# Patient Record
Sex: MALE | Race: BLACK OR AFRICAN AMERICAN | NOT HISPANIC OR LATINO | Employment: OTHER | URBAN - METROPOLITAN AREA
[De-identification: names, ages, dates, MRNs, and addresses within clinical notes are randomized per-mention and may not be internally consistent; named-entity substitution may affect disease eponyms.]

---

## 2018-12-14 ENCOUNTER — OFFICE VISIT (OUTPATIENT)
Dept: FAMILY MEDICINE | Facility: CLINIC | Age: 68
End: 2018-12-14
Payer: MEDICARE

## 2018-12-14 VITALS
BODY MASS INDEX: 27.66 KG/M2 | HEIGHT: 65 IN | TEMPERATURE: 97.1 F | DIASTOLIC BLOOD PRESSURE: 89 MMHG | OXYGEN SATURATION: 98 % | WEIGHT: 166 LBS | SYSTOLIC BLOOD PRESSURE: 147 MMHG | HEART RATE: 74 BPM

## 2018-12-14 DIAGNOSIS — Z76.89 ENCOUNTER TO ESTABLISH CARE: Primary | ICD-10-CM

## 2018-12-14 DIAGNOSIS — I20.89 STABLE ANGINA (H): ICD-10-CM

## 2018-12-14 DIAGNOSIS — I10 HTN (HYPERTENSION), BENIGN: ICD-10-CM

## 2018-12-14 DIAGNOSIS — E78.5 DYSLIPIDEMIA: ICD-10-CM

## 2018-12-14 DIAGNOSIS — Z76.0 ENCOUNTER FOR MEDICATION REFILL: ICD-10-CM

## 2018-12-14 DIAGNOSIS — I10 BENIGN ESSENTIAL HYPERTENSION: ICD-10-CM

## 2018-12-14 DIAGNOSIS — K21.9 GASTROESOPHAGEAL REFLUX DISEASE WITHOUT ESOPHAGITIS: ICD-10-CM

## 2018-12-14 DIAGNOSIS — R73.02 GLUCOSE INTOLERANCE (IMPAIRED GLUCOSE TOLERANCE): ICD-10-CM

## 2018-12-14 DIAGNOSIS — N40.0 BENIGN PROSTATIC HYPERPLASIA WITHOUT LOWER URINARY TRACT SYMPTOMS: ICD-10-CM

## 2018-12-14 PROCEDURE — 99203 OFFICE O/P NEW LOW 30 MIN: CPT | Performed by: FAMILY MEDICINE

## 2018-12-14 PROCEDURE — T1013 SIGN LANG/ORAL INTERPRETER: HCPCS | Mod: U3 | Performed by: FAMILY MEDICINE

## 2018-12-14 RX ORDER — NITROGLYCERIN 0.4 MG/1
0.4 TABLET SUBLINGUAL EVERY 5 MIN PRN
COMMUNITY
End: 2018-12-14

## 2018-12-14 RX ORDER — TAMSULOSIN HYDROCHLORIDE 0.4 MG/1
0.4 CAPSULE ORAL AT BEDTIME
Qty: 90 CAPSULE | Refills: 3 | Status: SHIPPED | OUTPATIENT
Start: 2018-12-14 | End: 2020-01-20

## 2018-12-14 RX ORDER — LISINOPRIL 10 MG/1
10 TABLET ORAL DAILY
Qty: 90 TABLET | Refills: 3 | Status: SHIPPED | OUTPATIENT
Start: 2018-12-14 | End: 2020-01-20

## 2018-12-14 RX ORDER — TAMSULOSIN HYDROCHLORIDE 0.4 MG/1
0.4 CAPSULE ORAL DAILY
Qty: 90 CAPSULE | Refills: 3 | Status: SHIPPED | OUTPATIENT
Start: 2018-12-14 | End: 2018-12-14

## 2018-12-14 RX ORDER — TAMSULOSIN HYDROCHLORIDE 0.4 MG/1
0.4 CAPSULE ORAL DAILY
COMMUNITY
End: 2018-12-14

## 2018-12-14 RX ORDER — LISINOPRIL 10 MG/1
10 TABLET ORAL DAILY
COMMUNITY
End: 2018-12-14

## 2018-12-14 RX ORDER — NITROGLYCERIN 0.4 MG/1
0.4 TABLET SUBLINGUAL EVERY 5 MIN PRN
Qty: 30 TABLET | Refills: 3 | Status: SHIPPED | OUTPATIENT
Start: 2018-12-14 | End: 2020-01-20

## 2018-12-14 RX ORDER — METOPROLOL SUCCINATE 50 MG/1
50 TABLET, EXTENDED RELEASE ORAL DAILY
Qty: 90 TABLET | Refills: 3 | Status: SHIPPED | OUTPATIENT
Start: 2018-12-14 | End: 2020-01-20

## 2018-12-14 RX ORDER — ATORVASTATIN CALCIUM 80 MG/1
80 TABLET, FILM COATED ORAL DAILY
Qty: 90 TABLET | Refills: 3 | Status: SHIPPED | OUTPATIENT
Start: 2018-12-14 | End: 2020-01-20

## 2018-12-14 RX ORDER — ATORVASTATIN CALCIUM 80 MG/1
80 TABLET, FILM COATED ORAL DAILY
COMMUNITY
End: 2018-12-14

## 2018-12-14 RX ORDER — METOPROLOL SUCCINATE 50 MG/1
50 TABLET, EXTENDED RELEASE ORAL DAILY
COMMUNITY
End: 2018-12-14

## 2018-12-14 SDOH — HEALTH STABILITY: MENTAL HEALTH: HOW OFTEN DO YOU HAVE A DRINK CONTAINING ALCOHOL?: NEVER

## 2018-12-14 ASSESSMENT — MIFFLIN-ST. JEOR: SCORE: 1452.97

## 2018-12-14 NOTE — PROGRESS NOTES
"  SUBJECTIVE:   Chris Otero is a 68 year old male who presents to clinic today for the following health issues:  Patient comes in today as a new patient, establish care and have a medication refill.  He reports 3 months ago he had a complete physical exam and blood work, he report blood work was normal.   He report in the past was told he has glucose introlance.    History of dyslipidemia, history of hypertension, history of problems, and probably benign prostate hypertrophy.    He also reports he does take nitroglycerin as needed a few times a month for chest pressure and pain.  He reports in the past he did have a complete cardiac workup which include stress test that was negative.  He reports he has an \" angina.\"    He denies chest pain, denies any pressure denies being weak denies pain with any exertion.  Denies recent sickness has no lower extremity edema.    Medication Followup of  All medications    Taking Medication as prescribed: yes    Side Effects:  None    Medication Helping Symptoms:  yes     Due to language barrier, an  was present during the history-taking and subsequent discussion (and for part of the physical exam) with this patient.    Problem list and histories reviewed & adjusted, as indicated.  Additional history: as documented    Patient Active Problem List   Diagnosis     Dyslipidemia     BPH (benign prostatic hyperplasia)     HTN (hypertension), benign     Glucose intolerance (impaired glucose tolerance)     Stable angina (H)     GERD (gastroesophageal reflux disease)     Past Surgical History:   Procedure Laterality Date     XR ANKLE SURGERY LUIS RIGHT      S/P fx.       Social History     Tobacco Use     Smoking status: Never Smoker     Smokeless tobacco: Never Used   Substance Use Topics     Alcohol use: No     Frequency: Never     History reviewed. No pertinent family history.      Current Outpatient Medications   Medication Sig Dispense Refill     atorvastatin (LIPITOR) 80 MG " "tablet Take 1 tablet (80 mg) by mouth daily 90 tablet 3     lisinopril (PRINIVIL/ZESTRIL) 10 MG tablet Take 1 tablet (10 mg) by mouth daily 90 tablet 3     metoprolol succinate ER (TOPROL-XL) 50 MG 24 hr tablet Take 1 tablet (50 mg) by mouth daily 90 tablet 3     nitroGLYcerin (NITROSTAT) 0.4 MG sublingual tablet Place 1 tablet (0.4 mg) under the tongue every 5 minutes as needed for chest pain For chest pain place 1 tablet under the tongue every 5 minutes for 3 doses. If symptoms persist 5 minutes after 1st dose call 911. 30 tablet 3     omeprazole (PRILOSEC) 20 MG DR capsule Take 1 capsule (20 mg) by mouth daily 90 capsule 3     tamsulosin (FLOMAX) 0.4 MG capsule Take 1 capsule (0.4 mg) by mouth At Bedtime 90 capsule 3     Not on File  No lab results found.   BP Readings from Last 3 Encounters:   12/14/18 147/89    Wt Readings from Last 3 Encounters:   12/14/18 75.3 kg (166 lb)                    Reviewed and updated as needed this visit by clinical staff  Tobacco  Allergies  Med Hx  Surg Hx  Fam Hx  Soc Hx      Reviewed and updated as needed this visit by Provider         ROS:  Constitutional, HEENT, cardiovascular, pulmonary, gi and gu systems are negative, except as otherwise noted.    OBJECTIVE:     /89 (BP Location: Right arm, Patient Position: Chair, Cuff Size: Adult Regular)   Pulse 74   Temp 97.1  F (36.2  C) (Oral)   Ht 1.64 m (5' 4.57\")   Wt 75.3 kg (166 lb)   SpO2 98%   BMI 28.00 kg/m    Body mass index is 28 kg/m .  GENERAL: healthy, alert and no distress  NECK: no adenopathy, no asymmetry, masses, or scars and thyroid normal to palpation  RESP: lungs clear to auscultation - no rales, rhonchi or wheezes  CV: regular rate and rhythm, normal S1 S2, no S3 or S4, no murmur, click or rub, no peripheral edema and peripheral pulses strong  ABDOMEN: soft, nontender, no hepatosplenomegaly, no masses and bowel sounds normal  MS: no gross musculoskeletal defects noted, no edema  NEURO: Normal " strength and tone, mentation intact and speech normal  PSYCH: mentation appears normal, affect normal/bright    Diagnostic Test Results:  none     ASSESSMENT/PLAN:     Hyperlipidemia; controlled   Plan:  No changes in the patient's current treatment plan    Hypertension; controlled   Associated with the following complications:    None   Plan:  No changes in the patient's current treatment plan      BP Screening:   Last 3 BP Readings:    BP Readings from Last 3 Encounters:   12/14/18 147/89       The following was recommended to the patient:  Re-screen within 4 weeks and recommend lifestyle modifications      ICD-10-CM    1. Encounter to establish care Z76.89    2. Benign essential hypertension I10 metoprolol succinate ER (TOPROL-XL) 50 MG 24 hr tablet     lisinopril (PRINIVIL/ZESTRIL) 10 MG tablet   3. Benign prostatic hyperplasia without lower urinary tract symptoms N40.0 tamsulosin (FLOMAX) 0.4 MG capsule     DISCONTINUED: tamsulosin (FLOMAX) 0.4 MG capsule   4. Dyslipidemia E78.5 atorvastatin (LIPITOR) 80 MG tablet   5. Gastroesophageal reflux disease without esophagitis K21.9 omeprazole (PRILOSEC) 20 MG DR capsule   6. HTN (hypertension), benign I10    7. Glucose intolerance (impaired glucose tolerance) R73.02    8. Stable angina (H) I20.8 nitroGLYcerin (NITROSTAT) 0.4 MG sublingual tablet   9. Encounter for medication refill Z76.0 omeprazole (PRILOSEC) 20 MG DR capsule     nitroGLYcerin (NITROSTAT) 0.4 MG sublingual tablet     metoprolol succinate ER (TOPROL-XL) 50 MG 24 hr tablet     lisinopril (PRINIVIL/ZESTRIL) 10 MG tablet     atorvastatin (LIPITOR) 80 MG tablet     tamsulosin (FLOMAX) 0.4 MG capsule     I did go over his history, his medication.  I did review his medication.    Patient reports a few months ago he had a complete physical with a normal blood work.    Was advised to follow-up in 3-6 months for complete annual exam.  MEDICATIONS:  Continue current medications without change       Ishan REGAN  MD Sukumar  [facility]

## 2019-02-13 ENCOUNTER — OFFICE VISIT (OUTPATIENT)
Dept: FAMILY MEDICINE | Facility: CLINIC | Age: 69
End: 2019-02-13
Payer: MEDICARE

## 2019-02-13 VITALS
BODY MASS INDEX: 27.66 KG/M2 | HEART RATE: 74 BPM | SYSTOLIC BLOOD PRESSURE: 143 MMHG | TEMPERATURE: 97.8 F | DIASTOLIC BLOOD PRESSURE: 85 MMHG | WEIGHT: 164 LBS | OXYGEN SATURATION: 100 %

## 2019-02-13 DIAGNOSIS — Z01.00 EXAMINATION OF EYES AND VISION: ICD-10-CM

## 2019-02-13 DIAGNOSIS — G43.109 MIGRAINE WITH AURA AND WITHOUT STATUS MIGRAINOSUS, NOT INTRACTABLE: Primary | ICD-10-CM

## 2019-02-13 DIAGNOSIS — G89.29 CHRONIC LOW BACK PAIN WITHOUT SCIATICA, UNSPECIFIED BACK PAIN LATERALITY: ICD-10-CM

## 2019-02-13 DIAGNOSIS — S39.012A STRAIN OF LUMBAR REGION, INITIAL ENCOUNTER: ICD-10-CM

## 2019-02-13 DIAGNOSIS — L85.3 DRY SKIN: ICD-10-CM

## 2019-02-13 DIAGNOSIS — M54.50 CHRONIC LOW BACK PAIN WITHOUT SCIATICA, UNSPECIFIED BACK PAIN LATERALITY: ICD-10-CM

## 2019-02-13 PROCEDURE — 99214 OFFICE O/P EST MOD 30 MIN: CPT | Performed by: FAMILY MEDICINE

## 2019-02-13 RX ORDER — SUMATRIPTAN 50 MG/1
50 TABLET, FILM COATED ORAL
Qty: 20 TABLET | Refills: 6 | Status: SHIPPED | OUTPATIENT
Start: 2019-02-13 | End: 2020-02-13

## 2019-02-13 RX ORDER — LANOLIN ALCOHOL/MO/W.PET/CERES
CREAM (GRAM) TOPICAL
Qty: 480 ML | Refills: 6 | Status: SHIPPED | OUTPATIENT
Start: 2019-02-13 | End: 2020-02-13

## 2019-02-13 NOTE — PATIENT INSTRUCTIONS
Follow up in near future for complete exam  Patient Education     Patient Education    D-Alpha Tochopheryl Acetate (Vitamin E) Oral capsule    D-Alpha Tochopheryl Acetate (Vitamin E) Oral capsule, liquid filled    D-Alpha Tochopheryl Acetate (Vitamin E) Topical oil    D-Alpha Tocopherol (Vitamin E) (Natural) Oral capsule    Dl-Alpha Tocopheryl Acetate (Vitamin E) Oral capsule    Dl-Alpha Tocopheryl Acetate (Vitamin E) Oral capsule, liquid filled    Dl-Alpha Tocopheryl Acetate (Vitamin E) Topical oil    Tocopherol (Vitamin E) Oral capsule    Tocopherol Acetate (Vitamin E) Topical cream    Vitamin E Oral capsule    Vitamin E Oral capsule, liquid filled    Vitamin E Oral drops, solution    Vitamin E Topical cream  Vitamin E Topical cream  What is this medicine?  VITAMIN E (VAJESSICA borregoh min E) is a vitamin that is being promoted for its ability to protect against aging of the skin caused by ultraviolet light, such as sunlight and to aid in the healing of minor burns and sunburns. These claims have not been evaluated by the FDA at this time. Vitamin E is a naturally occurring vitamin that is found in many foods such as cereal grains, fruits, green leafy vegetables, vegetable oils, and wheat germ oil.  This medicine may be used for other purposes; ask your health care provider or pharmacist if you have questions.  What should I tell my health care provider before I take this medicine?  They need to know if you have any of these conditions:    an unusual or allergic reaction to Vitamin E, other medicines, foods, dyes, or preservatives    pregnant or trying to get pregnant    breast-feeding  How should I use this medicine?  This medicine is for external use only. Do not take by mouth. Follow the directions on the label. Avoid contact with the eyes. If contact occurs, rinse the eyes well with plenty of cool tap water.  Talk to your pediatrician regarding the use of this medicine in children. Special care may be  needed.  Overdosage: If you think you have taken too much of this medicine contact a poison control center or emergency room at once.  NOTE: This medicine is only for you. Do not share this medicine with others.  What if I miss a dose?  This does not apply.  What may interact with this medicine?  Interactions are not expected.  This list may not describe all possible interactions. Give your health care provider a list of all the medicines, herbs, non-prescription drugs, or dietary supplements you use. Also tell them if you smoke, drink alcohol, or use illegal drugs. Some items may interact with your medicine.  What should I watch for while using this medicine?  When used to reduce fine facial lines, you may not notice any improvements right away. it may take several months to see any claimed effects. This product may not work for everyone who uses it.  If skin irritation occurs while using this product, stop using it.  There is no evidence at this time that Vitamin E skin products aid in healing of minor burns or sunburn. Use only if directed by your doctor or health care professional.  What side effects may I notice from receiving this medicine?  Side effects that you should report to your doctor or health care professional as soon as possible:    allergic reactions like skin rash, itching or hives, swelling of the face, lips, or tongue    severe burning, irritation, crusting, or swelling of the treated areas  Side effects that usually do not require medical attention (report to your doctor or health care professional if they continue or are bothersome):    mild skin irritation  This list may not describe all possible side effects. Call your doctor for medical advice about side effects. You may report side effects to FDA at 8-576-FDA-7668.  Where should I keep my medicine?  Keep out of the reach of children.  Store at room temperature between 15 and 30 degrees C (59 and 86 degrees F). Throw away any unused medicine  after the expiration date.  NOTE:This sheet is a summary. It may not cover all possible information. If you have questions about this medicine, talk to your doctor, pharmacist, or health care provider. Copyright  2016 [employer]

## 2019-02-13 NOTE — PROGRESS NOTES
SUBJECTIVE:                                                    Chris Otero is a 69 year old male who presents to clinic today for the following health issues:  Patient comes in today with a few concerns.  He reports he has chronic low back pain.  He reports he fell over 2 weeks ago onto his right side.  He reports since that time he has been having some stiffness mostly in the right lower lumbar region area.  He denies any lower extremity numbness, he has no weakness.  He is able to walk and do all his activities of daily living with no difficulty.  He also denies any pain when he sleeps no pain at night.  No loss of urine or stool.    He also has history of migraine headache he reports for the past a few months he has been having more frequent mostly once or twice per month he reports that they last for a few hours.  He reports a headache mostly throbbing in the frontal area sometimes he had changes in his vision.    Concern of a dry skin.  Especially since the beginning of the winter he reports that usually after he take a shower.  Denies any rashes.  He has no but does not scratch his skin. He is been using Vaseline lotions.     Patient also would like to have referred to have an eye exam.  Have not had one in a while.    Back Pain       Duration: A week ago        Specific cause: fall     Description:   Location of pain: low back right  Character of pain: sharp, burning, cramping and constant  Pain radiation:none  New numbness or weakness in legs, not attributed to pain:  no     Intensity: Currently 5/10, At its worst 10/10    History:   Pain interferes with job: Not applicable  History of back problems: no prior back problems  Any previous MRI or X-rays: None  Sees a specialist for back pain:  No  Therapies tried without relief: acetaminophen (Tylenol)    Alleviating factors:   Improved by: rest      Precipitating factors:  Worsened by: Lifting, Standing, Sitting and Walking    Accompanying Signs &  Symptoms:  Risk of Fracture:  None  Risk of Cauda Equina:  None  Risk of Infection:  None  Risk of Cancer:  None  Risk of Ankylosing Spondylitis:  Onset at age <35, male, AND morning back stiffness. no       Problem list and histories reviewed & adjusted, as indicated.  Additional history: as documented    Patient Active Problem List   Diagnosis     Dyslipidemia     BPH (benign prostatic hyperplasia)     HTN (hypertension), benign     Glucose intolerance (impaired glucose tolerance)     Stable angina (H)     GERD (gastroesophageal reflux disease)     Migraine headache with aura     Chronic low back pain     Past Surgical History:   Procedure Laterality Date     XR ANKLE SURGERY LUIS RIGHT      S/P fx.       Social History     Tobacco Use     Smoking status: Never Smoker     Smokeless tobacco: Never Used   Substance Use Topics     Alcohol use: No     Frequency: Never     History reviewed. No pertinent family history.      Current Outpatient Medications   Medication Sig Dispense Refill     aspirin (ASA) 81 MG tablet Take 1 tablet (81 mg) by mouth daily 90 tablet 3     atorvastatin (LIPITOR) 80 MG tablet Take 1 tablet (80 mg) by mouth daily 90 tablet 3     EUCERIN external lotion Apply tid as needed 480 mL 6     lisinopril (PRINIVIL/ZESTRIL) 10 MG tablet Take 1 tablet (10 mg) by mouth daily 90 tablet 3     metoprolol succinate ER (TOPROL-XL) 50 MG 24 hr tablet Take 1 tablet (50 mg) by mouth daily 90 tablet 3     nitroGLYcerin (NITROSTAT) 0.4 MG sublingual tablet Place 1 tablet (0.4 mg) under the tongue every 5 minutes as needed for chest pain For chest pain place 1 tablet under the tongue every 5 minutes for 3 doses. If symptoms persist 5 minutes after 1st dose call 911. 30 tablet 3     omeprazole (PRILOSEC) 20 MG DR capsule Take 1 capsule (20 mg) by mouth daily 90 capsule 3     SUMAtriptan (IMITREX) 50 MG tablet Take 1 tablet (50 mg) by mouth at onset of headache for migraine 20 tablet 6     tamsulosin (FLOMAX) 0.4 MG  capsule Take 1 capsule (0.4 mg) by mouth At Bedtime 90 capsule 3     Not on File  No lab results found.     ROS:  Constitutional, HEENT, cardiovascular, pulmonary, gi and gu systems are negative, except as otherwise noted.    OBJECTIVE:     /85 (BP Location: Right arm, Patient Position: Chair, Cuff Size: Adult Large)   Pulse 74   Temp 97.8  F (36.6  C) (Oral)   Wt 74.4 kg (164 lb)   SpO2 100%   BMI 27.66 kg/m    Body mass index is 27.66 kg/m .  GENERAL APPEARANCE: healthy, alert and no distress  ABDOMEN: soft, nontender, without hepatosplenomegaly or masses and bowel sounds normal  MS: extremities normal- no gross deformities noted  ORTHO: Lumber/Thoracic Spine Exam: Tender:  none  Non-tender:  All normal  Range of Motion:  lumbar flexion  full, lumbar extension  full  Strength:  able to heel walk, able to toe walk  Special tests:  negative straight leg raises    Hip Exam: Hip ROM full    SKIN: no suspicious lesions or rashes and xerosis - generalized  NEURO: Normal strength and tone, mentation intact, DTR symmetrically normal in lower extremities and gait normal including heel/toe/tandem walking    Diagnostic Test Results:  none     ASSESSMENT/PLAN:       ICD-10-CM    1. Migraine with aura and without status migrainosus, not intractable G43.109 SUMAtriptan (IMITREX) 50 MG tablet   2. Strain of lumbar region, initial encounter S39.012A    3. Dry skin L85.3 EUCERIN external lotion   4. Examination of eyes and vision Z01.00 OPTOMETRY REFERRAL   5. Chronic low back pain without sciatica, unspecified back pain laterality M54.5     G89.29    #1 low back pain likely muscle strain.  He was advised to remain active.  Currently, does not have any pain.  Patient reports he does not need any pain medication at this point.    2.  History of migraine headache given prescription for sumatriptan.  use as needed.  In addition, patient does take metoprolol succinate for blood pressure and that will help as a  prophylaxis.    #3 dry skin could be related to dry weather, cold weather.  He was advised to avoid hot water.  He was advised to use warm water and a quick short showers.  In addition, I advised him to use Eucerin cream or other Aveeno cream lotion his skin especially after shower.  In addition he was advised to use a different soap.    4.  Patient was referred to optometry for to have an eye exam.    I did discuss with the patient to follow-up in the near future to have a complete physical exam.    See Patient Instructions  Patient Instructions   Follow up in near future for complete exam  Patient Education     Patient Education    D-Alpha Tochopheryl Acetate (Vitamin E) Oral capsule    D-Alpha Tochopheryl Acetate (Vitamin E) Oral capsule, liquid filled    D-Alpha Tochopheryl Acetate (Vitamin E) Topical oil    D-Alpha Tocopherol (Vitamin E) (Natural) Oral capsule    Dl-Alpha Tocopheryl Acetate (Vitamin E) Oral capsule    Dl-Alpha Tocopheryl Acetate (Vitamin E) Oral capsule, liquid filled    Dl-Alpha Tocopheryl Acetate (Vitamin E) Topical oil    Tocopherol (Vitamin E) Oral capsule    Tocopherol Acetate (Vitamin E) Topical cream    Vitamin E Oral capsule    Vitamin E Oral capsule, liquid filled    Vitamin E Oral drops, solution    Vitamin E Topical cream  Vitamin E Topical cream  What is this medicine?  VITAMIN E (VAHY tuh min E) is a vitamin that is being promoted for its ability to protect against aging of the skin caused by ultraviolet light, such as sunlight and to aid in the healing of minor burns and sunburns. These claims have not been evaluated by the FDA at this time. Vitamin E is a naturally occurring vitamin that is found in many foods such as cereal grains, fruits, green leafy vegetables, vegetable oils, and wheat germ oil.  This medicine may be used for other purposes; ask your health care provider or pharmacist if you have questions.  What should I tell my health care provider before I take this  medicine?  They need to know if you have any of these conditions:    an unusual or allergic reaction to Vitamin E, other medicines, foods, dyes, or preservatives    pregnant or trying to get pregnant    breast-feeding  How should I use this medicine?  This medicine is for external use only. Do not take by mouth. Follow the directions on the label. Avoid contact with the eyes. If contact occurs, rinse the eyes well with plenty of cool tap water.  Talk to your pediatrician regarding the use of this medicine in children. Special care may be needed.  Overdosage: If you think you have taken too much of this medicine contact a poison control center or emergency room at once.  NOTE: This medicine is only for you. Do not share this medicine with others.  What if I miss a dose?  This does not apply.  What may interact with this medicine?  Interactions are not expected.  This list may not describe all possible interactions. Give your health care provider a list of all the medicines, herbs, non-prescription drugs, or dietary supplements you use. Also tell them if you smoke, drink alcohol, or use illegal drugs. Some items may interact with your medicine.  What should I watch for while using this medicine?  When used to reduce fine facial lines, you may not notice any improvements right away. it may take several months to see any claimed effects. This product may not work for everyone who uses it.  If skin irritation occurs while using this product, stop using it.  There is no evidence at this time that Vitamin E skin products aid in healing of minor burns or sunburn. Use only if directed by your doctor or health care professional.  What side effects may I notice from receiving this medicine?  Side effects that you should report to your doctor or health care professional as soon as possible:    allergic reactions like skin rash, itching or hives, swelling of the face, lips, or tongue    severe burning, irritation, crusting, or  swelling of the treated areas  Side effects that usually do not require medical attention (report to your doctor or health care professional if they continue or are bothersome):    mild skin irritation  This list may not describe all possible side effects. Call your doctor for medical advice about side effects. You may report side effects to FDA at 5-102-PLM-1201.  Where should I keep my medicine?  Keep out of the reach of children.  Store at room temperature between 15 and 30 degrees C (59 and 86 degrees F). Throw away any unused medicine after the expiration date.  NOTE:This sheet is a summary. It may not cover all possible information. If you have questions about this medicine, talk to your doctor, pharmacist, or health care provider. Copyright  2016 [employer] Standard               Ishan Patino MD  [facility]

## 2019-02-21 ENCOUNTER — APPOINTMENT (OUTPATIENT)
Dept: OPTOMETRY | Facility: CLINIC | Age: 69
End: 2019-02-21
Payer: MEDICARE

## 2019-02-21 ENCOUNTER — OFFICE VISIT (OUTPATIENT)
Dept: OPTOMETRY | Facility: CLINIC | Age: 69
End: 2019-02-21
Payer: MEDICARE

## 2019-02-21 DIAGNOSIS — Z01.01 ENCOUNTER FOR EXAMINATION OF EYES AND VISION WITH ABNORMAL FINDINGS: Primary | ICD-10-CM

## 2019-02-21 DIAGNOSIS — H52.03 HYPERMETROPIA, BILATERAL: ICD-10-CM

## 2019-02-21 DIAGNOSIS — H52.4 PRESBYOPIA: ICD-10-CM

## 2019-02-21 DIAGNOSIS — H25.813 COMBINED FORMS OF AGE-RELATED CATARACT OF BOTH EYES: ICD-10-CM

## 2019-02-21 DIAGNOSIS — H04.123 DRY EYES: ICD-10-CM

## 2019-02-21 DIAGNOSIS — H52.223 REGULAR ASTIGMATISM OF BOTH EYES: ICD-10-CM

## 2019-02-21 PROCEDURE — 92015 DETERMINE REFRACTIVE STATE: CPT | Performed by: OPTOMETRIST

## 2019-02-21 PROCEDURE — 92004 COMPRE OPH EXAM NEW PT 1/>: CPT | Performed by: OPTOMETRIST

## 2019-02-21 PROCEDURE — 92341 FIT SPECTACLES BIFOCAL: CPT | Performed by: OPTOMETRIST

## 2019-02-21 ASSESSMENT — REFRACTION_MANIFEST
OD_SPHERE: +1.25
OS_CYLINDER: +0.50
OS_SPHERE: +2.00
OD_CYLINDER: +1.50
OD_AXIS: 145
OS_AXIS: 175
OD_ADD: +2.50
OS_ADD: +2.50

## 2019-02-21 ASSESSMENT — REFRACTION_WEARINGRX
OD_ADD: +2.75
OS_CYLINDER: +0.50
OD_SPHERE: +2.25
OD_AXIS: 092
OS_AXIS: 034
SPECS_TYPE: BIFOCAL
OS_SPHERE: +2.00
OS_ADD: +2.75
OD_CYLINDER: +0.50

## 2019-02-21 ASSESSMENT — EXTERNAL EXAM - LEFT EYE: OS_EXAM: NORMAL

## 2019-02-21 ASSESSMENT — EXTERNAL EXAM - RIGHT EYE: OD_EXAM: NORMAL

## 2019-02-21 ASSESSMENT — SLIT LAMP EXAM - LIDS
COMMENTS: NORMAL
COMMENTS: NORMAL

## 2019-02-21 ASSESSMENT — VISUAL ACUITY
OD_CC: 20/60
OD_SC: 20/50
OD_CC: 20/80
CORRECTION_TYPE: GLASSES
OS_SC: 20/50
OD_SC: 20/120
METHOD: SNELLEN - LINEAR
OS_CC: 20/30 -1
OD_SC+: -1
OS_CC: 20/25
OS_SC: 20/200

## 2019-02-21 ASSESSMENT — CONF VISUAL FIELD
OS_NORMAL: 1
OD_NORMAL: 1

## 2019-02-21 ASSESSMENT — CUP TO DISC RATIO
OS_RATIO: 0.4
OD_RATIO: 0.4

## 2019-02-21 ASSESSMENT — TONOMETRY
OS_IOP_MMHG: 10
OD_IOP_MMHG: 12
IOP_METHOD: APPLANATION

## 2019-02-21 NOTE — LETTER
2/21/2019         RE: Chris Otero  [address]  [place] [address]        Dear Colleague,    Thank you for referring your patient, Chris Otero, to the [facility]. Please see a copy of my visit note below.    Chief Complaint   Patient presents with     Diabetic Eye Exam     Accompanied by   No results found for: A1C    Last Eye Exam: 1 years ago  Dilated Previously: Yes    What are you currently using to see?  Glasses- 5 years old    Distance Vision Acuity: Noticed gradual change in both eyes    Near Vision Acuity: Not satisfied     Eye Comfort: dry  Do you use eye drops? : No  Occupation or Hobbies: unemployed    Tali Silva, Optometric Tech     Medical, surgical and family histories reviewed and updated 2/21/2019.       OBJECTIVE: See Ophthalmology exam    ASSESSMENT:    ICD-10-CM    1. Encounter for examination of eyes and vision with abnormal findings Z01.01    2. Dry eyes H04.123    3. Combined forms of age-related cataract of both eyes H25.813    4. 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 Regular astigmatism of both eyes H52.223    6. Presbyopia H52.4       PLAN:    Chris Otero aware  eye exam results will be sent to Ishan Patino.  Patient Instructions    DRY EYE TREATMENT    I recommend using artificial tears for your dry eye. There are over the counter drops that work well and may be used up to 4x daily. ( systane balance, refresh optive, soothe xp)   If you need more than 4 drops daily, use a preservative free product which come in individual vials which may be used for 24 hours and discarded.     Artificial tears work best as a preventative and not as well after your eyes are starting to bother you.  It may take 4- 6 weeks of using the drops before you notice improvement.  If after that time you are still having problems schedule an appointment for an evaluation and discussion of different treatments.  Dry eyes are a chronic condition and you may have  more symptoms at certain times of the year.      Additional recommended treatment:  Warm compresses once to twice daily for 5-10 minutes    Directions for warm soaks  There are few methods for hot compresses. Moisten a washcloth with hot water, or microwave for 10 seconds, being careful to not get the cloth too hot.   Then put the washcloth onto your eyelids for 5 minutes. It will cool quickly so a rice pack or eyemask that can be heated and laid on top of the washcloth will help retain the heat.     You have the start cataract formation, more progressed in the right than the left eye.  You may notice some blurred vision or glare with night driving.  It is important that you wear good sunglasses to protect your eyes from the ultraviolet light from the sun. I recommend that you return in 1 year for an eye exam unless there are any sudden changes in your vision.       Chris was advised of today's exam findings.  Fill glasses prescription  Allow 2 weeks to adapt to change in glasses  Wear glasses full time  Copy of glasses Rx provided today.  Return in 1 year for eye exam, or sooner if needed.    The affects of the dilating drops last for 4- 6 hours.  You will be more sensitive to light and vision will be blurry up close.  Mydriatic sunglasses were given if needed.    Evaristo Watt O.D.  [address] [place]  [address]    [phone]               Again, thank you for allowing me to participate in the care of your patient.        Sincerely,        Evaristo Watt, OD

## 2019-02-21 NOTE — PATIENT INSTRUCTIONS
DRY EYE TREATMENT    I recommend using artificial tears for your dry eye. There are over the counter drops that work well and may be used up to 4x daily. ( systane balance, refresh optive, soothe xp)   If you need more than 4 drops daily, use a preservative free product which come in individual vials which may be used for 24 hours and discarded.     Artificial tears work best as a preventative and not as well after your eyes are starting to bother you.  It may take 4- 6 weeks of using the drops before you notice improvement.  If after that time you are still having problems schedule an appointment for an evaluation and discussion of different treatments.  Dry eyes are a chronic condition and you may have more symptoms at certain times of the year.      Additional recommended treatment:  Warm compresses once to twice daily for 5-10 minutes    Directions for warm soaks  There are few methods for hot compresses. Moisten a washcloth with hot water, or microwave for 10 seconds, being careful to not get the cloth too hot.   Then put the washcloth onto your eyelids for 5 minutes. It will cool quickly so a rice pack or eyemask that can be heated and laid on top of the washcloth will help retain the heat.     You have the start cataract formation, more progressed in the right than the left eye.  You may notice some blurred vision or glare with night driving.  It is important that you wear good sunglasses to protect your eyes from the ultraviolet light from the sun. I recommend that you return in 1 year for an eye exam unless there are any sudden changes in your vision.       Chris was advised of today's exam findings.  Fill glasses prescription  Allow 2 weeks to adapt to change in glasses  Wear glasses full time  Copy of glasses Rx provided today.  Return in 1 year for eye exam, or sooner if needed.    The affects of the dilating drops last for 4- 6 hours.  You will be more sensitive to light and vision will be blurry up  close.  Mydriatic sunglasses were given if needed.    Evaristo Watt O.D.  [address]  [place]  [address]    [phone]

## 2019-02-21 NOTE — PROGRESS NOTES
Chief Complaint   Patient presents with     Diabetic Eye Exam     Accompanied by   No results found for: A1C    Last Eye Exam: 1 years ago  Dilated Previously: Yes    What are you currently using to see?  Glasses- 5 years old    Distance Vision Acuity: Noticed gradual change in both eyes    Near Vision Acuity: Not satisfied     Eye Comfort: dry  Do you use eye drops? : No  Occupation or Hobbies: unemployed    Tali Silva, Optometric Tech     Medical, surgical and family histories reviewed and updated 2/21/2019.       OBJECTIVE: See Ophthalmology exam    ASSESSMENT:    ICD-10-CM    1. Encounter for examination of eyes and vision with abnormal findings Z01.01    2. Dry eyes H04.123    3. Combined forms of age-related cataract of both eyes H25.813    4. 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 Regular astigmatism of both eyes H52.223    6. Presbyopia H52.4       PLAN:    Chris Otero aware  eye exam results will be sent to Ishan Patino.  Patient Instructions    DRY EYE TREATMENT    I recommend using artificial tears for your dry eye. There are over the counter drops that work well and may be used up to 4x daily. ( systane balance, refresh optive, soothe xp)   If you need more than 4 drops daily, use a preservative free product which come in individual vials which may be used for 24 hours and discarded.     Artificial tears work best as a preventative and not as well after your eyes are starting to bother you.  It may take 4- 6 weeks of using the drops before you notice improvement.  If after that time you are still having problems schedule an appointment for an evaluation and discussion of different treatments.  Dry eyes are a chronic condition and you may have more symptoms at certain times of the year.      Additional recommended treatment:  Warm compresses once to twice daily for 5-10 minutes    Directions for warm soaks  There are few methods for hot compresses. Moisten a washcloth with hot water, or  microwave for 10 seconds, being careful to not get the cloth too hot.   Then put the washcloth onto your eyelids for 5 minutes. It will cool quickly so a rice pack or eyemask that can be heated and laid on top of the washcloth will help retain the heat.     You have the start cataract formation, more progressed in the right than the left eye.  You may notice some blurred vision or glare with night driving.  It is important that you wear good sunglasses to protect your eyes from the ultraviolet light from the sun. I recommend that you return in 1 year for an eye exam unless there are any sudden changes in your vision.       Chris was advised of today's exam findings.  Fill glasses prescription  Allow 2 weeks to adapt to change in glasses  Wear glasses full time  Copy of glasses Rx provided today.  Return in 1 year for eye exam, or sooner if needed.    The affects of the dilating drops last for 4- 6 hours.  You will be more sensitive to light and vision will be blurry up close.  Mydriatic sunglasses were given if needed.    Evaristo Watt O.D.  [address] [place]  [address]    [phone]

## 2019-05-14 ENCOUNTER — TELEPHONE (OUTPATIENT)
Dept: FAMILY MEDICINE | Facility: CLINIC | Age: 69
End: 2019-05-14

## 2019-05-14 NOTE — TELEPHONE ENCOUNTER
Red flag call taken, daughter is calling regarding her father.    He speaks some English but she is largely interpreting, she is not physically with patient so called him and conferenced him in as she did not know a lot of details.    Chris Otero is a 69 year old male who calls with chest pain.     PRESENTING PROBLEM:  Chest pain    NURSING ASSESSMENT:  Patient complains of chest pain and chest pressure/discomfort.  Onset:  More frequent since returned from [place] a week ago  Pain is characterized as pressure and tightness   Located left chest   Radiates to none.  Duration intermittent and lasting up to 5 minutes, the frequency of episodes and length of them has been increasing this past week (he reports he used to get about 2 episodes a month lasting 2 minutes).  Associated symptoms SOB.    Relieved by nitroglycerin does help.  Cardiac risk factors: prior MI.    Allergies: Not on File    MEDICATIONS:  Taking medication(s) as prescribed? N/A  Taking over the counter medication(s) ?N/A  Any medication side effects? Not Applicable    Any barriers to taking medication(s) as prescribed?  N/A  Medication(s) improving/managing symptoms?  Yes, nitor helping  Medication reconciliation completed: No    Last exam/Treatment:  2/13/19    NURSING PLAN: Nursing advice to patient to ER now, daughter will drive unless patient rapidly acutely worse then will call 911    RECOMMENDED DISPOSITION:  To ED, another person to drive - daughter  Will comply with recommendation: Yes  If further questions/concerns or if symptoms do not improve, worsen or new symptoms develop, call your PCP or [facility] as soon as possible.      Guideline used:  Chest pain  Telephone Triage Protocols for Nurses, Fifth Edition, Jayda Dorantes RN

## 2019-05-16 ENCOUNTER — OFFICE VISIT (OUTPATIENT)
Dept: FAMILY MEDICINE | Facility: CLINIC | Age: 69
End: 2019-05-16
Payer: MEDICARE

## 2019-05-16 VITALS
OXYGEN SATURATION: 98 % | TEMPERATURE: 97.4 F | DIASTOLIC BLOOD PRESSURE: 90 MMHG | WEIGHT: 158 LBS | BODY MASS INDEX: 26.65 KG/M2 | HEART RATE: 65 BPM | SYSTOLIC BLOOD PRESSURE: 147 MMHG

## 2019-05-16 DIAGNOSIS — I10 HTN (HYPERTENSION), BENIGN: ICD-10-CM

## 2019-05-16 DIAGNOSIS — I20.89 STABLE ANGINA (H): ICD-10-CM

## 2019-05-16 DIAGNOSIS — R07.9 CHEST PAIN, UNSPECIFIED TYPE: Primary | ICD-10-CM

## 2019-05-16 DIAGNOSIS — E78.5 DYSLIPIDEMIA: ICD-10-CM

## 2019-05-16 DIAGNOSIS — R73.02 GLUCOSE INTOLERANCE (IMPAIRED GLUCOSE TOLERANCE): ICD-10-CM

## 2019-05-16 LAB — HBA1C MFR BLD: 5.9 % (ref 0–5.6)

## 2019-05-16 PROCEDURE — 99214 OFFICE O/P EST MOD 30 MIN: CPT | Performed by: FAMILY MEDICINE

## 2019-05-16 PROCEDURE — 83036 HEMOGLOBIN GLYCOSYLATED A1C: CPT | Performed by: FAMILY MEDICINE

## 2019-05-16 PROCEDURE — 36415 COLL VENOUS BLD VENIPUNCTURE: CPT | Performed by: FAMILY MEDICINE

## 2019-05-16 PROCEDURE — 93000 ELECTROCARDIOGRAM COMPLETE: CPT | Performed by: FAMILY MEDICINE

## 2019-05-16 PROCEDURE — 80061 LIPID PANEL: CPT | Performed by: FAMILY MEDICINE

## 2019-05-16 PROCEDURE — 80053 COMPREHEN METABOLIC PANEL: CPT | Performed by: FAMILY MEDICINE

## 2019-05-16 ASSESSMENT — PAIN SCALES - GENERAL: PAINLEVEL: NO PAIN (0)

## 2019-05-16 NOTE — LETTER
[facility]   [address] [place]  [address]  [phone]                                   May 20, 2019    Chris Otero  [address]   [facility] [place] [address]        Dear Chris,    Normal for Lipid and CMP    Normal for A1c ( no diabetes )  Continue with current medications  Follow up in 6 months for Complete exam   thanks    Results for orders placed or performed in visit on 05/16/19   Lipid panel reflex to direct LDL Fasting   Result Value Ref Range    Cholesterol 163 <200 mg/dL    Triglycerides 71 <150 mg/dL    HDL Cholesterol 49 >39 mg/dL    LDL Cholesterol Calculated 100 (H) <100 mg/dL    Non HDL Cholesterol 114 <130 mg/dL   Comprehensive metabolic panel   Result Value Ref Range    Sodium 141 133 - 144 mmol/L    Potassium 4.0 3.4 - 5.3 mmol/L    Chloride 107 94 - 109 mmol/L    Carbon Dioxide 27 20 - 32 mmol/L    Anion Gap 7 3 - 14 mmol/L    Glucose 95 70 - 99 mg/dL    Urea Nitrogen 11 7 - 30 mg/dL    Creatinine 0.92 0.66 - 1.25 mg/dL    GFR Estimate 85 >60 mL/min/[1.73_m2]    GFR Estimate If Black >90 >60 mL/min/[1.73_m2]    Calcium 9.0 8.5 - 10.1 mg/dL    Bilirubin Total 0.5 0.2 - 1.3 mg/dL    Albumin 4.0 3.4 - 5.0 g/dL    Protein Total 7.2 6.8 - 8.8 g/dL    Alkaline Phosphatase 97 40 - 150 U/L    ALT 23 0 - 70 U/L    AST 11 0 - 45 U/L   Hemoglobin A1c   Result Value Ref Range    Hemoglobin A1C 5.9 (H) 0 - 5.6 %       If you have any questions please call the clinic at [phone]    Sincerely,    Ishan Patino MD  bmd

## 2019-05-16 NOTE — PROGRESS NOTES
SUBJECTIVE:   Chris Otero is a 69 year old male who presents to clinic today for the following   health issues:  Patient with history of stable angina.  History of hypertension, glucose intolerance.  Dyslipidemia.  Comes in today reports for the past 2 weeks he has been having episodes were he feels pressure in his chest, mostly in the left upper left mid area.  He reports sometimes he feels it under the diaphragm.  He reports never when he is walking or doing things.  He reports mostly when he is sitting.  He reports that it does not last for a minute or so.  He also reports a few episodes he did get better when he took nitroglycerin.    He denies any nausea, stomach upset. Pain did not radiate, He denies any radiation of the pain to his left arm or  back.  Denied being lightheaded or dizzy.  Otherwise he has been very stable and active.    Patient does not smoke.  He has no family history of heart disease.    Patient reports he had a stress test over 10 years ago.  He also reports he had an EKG about 5 years ago when [place] different [place].    Medication Followup of ASA and blood pressure     Taking Medication as prescribed: yes    Side Effects:  None    Medication Helping Symptoms:  yes     Additional history: as documented    Reviewed  and updated as needed this visit by clinical staff  Tobacco  Allergies  Med Hx  Surg Hx  Fam Hx  Soc Hx        Reviewed and updated as needed this visit by Provider         Patient Active Problem List   Diagnosis     Dyslipidemia     BPH (benign prostatic hyperplasia)     HTN (hypertension), benign     Glucose intolerance (impaired glucose tolerance)     Stable angina (H)     GERD (gastroesophageal reflux disease)     Migraine headache with aura     Chronic low back pain     Past Surgical History:   Procedure Laterality Date     XR ANKLE SURGERY LUIS RIGHT      S/P fx.       Social History     Tobacco Use     Smoking status: Never Smoker     Smokeless tobacco: Never  Used   Substance Use Topics     Alcohol use: No     Frequency: Never     Family History   Problem Relation Age of Onset     Glaucoma No family hx of      Macular Degeneration No family hx of          Current Outpatient Medications   Medication Sig Dispense Refill     aspirin (ASA) 81 MG tablet Take 1 tablet (81 mg) by mouth daily 90 tablet 3     atorvastatin (LIPITOR) 80 MG tablet Take 1 tablet (80 mg) by mouth daily 90 tablet 3     EUCERIN external lotion Apply tid as needed 480 mL 6     lisinopril (PRINIVIL/ZESTRIL) 10 MG tablet Take 1 tablet (10 mg) by mouth daily 90 tablet 3     metoprolol succinate ER (TOPROL-XL) 50 MG 24 hr tablet Take 1 tablet (50 mg) by mouth daily 90 tablet 3     nitroGLYcerin (NITROSTAT) 0.4 MG sublingual tablet Place 1 tablet (0.4 mg) under the tongue every 5 minutes as needed for chest pain For chest pain place 1 tablet under the tongue every 5 minutes for 3 doses. If symptoms persist 5 minutes after 1st dose call 911. 30 tablet 3     omeprazole (PRILOSEC) 20 MG DR capsule Take 1 capsule (20 mg) by mouth daily 90 capsule 3     SUMAtriptan (IMITREX) 50 MG tablet Take 1 tablet (50 mg) by mouth at onset of headache for migraine 20 tablet 6     tamsulosin (FLOMAX) 0.4 MG capsule Take 1 capsule (0.4 mg) by mouth At Bedtime 90 capsule 3     No Known Allergies    ROS:  Constitutional, HEENT, cardiovascular, pulmonary, gi and gu systems are negative, except as otherwise noted.    OBJECTIVE:     /90 (BP Location: Right arm, Patient Position: Chair, Cuff Size: Adult Regular)   Pulse 65   Temp 97.4  F (36.3  C) (Oral)   Wt 71.7 kg (158 lb)   SpO2 98%   BMI 26.65 kg/m    Body mass index is 26.65 kg/m .  GENERAL: healthy, alert and no distress  NECK: no adenopathy, no asymmetry, masses, or scars and thyroid normal to palpation  RESP: lungs clear to auscultation - no rales, rhonchi or wheezes  CV: regular rate and rhythm, normal S1 S2, no S3 or S4, no murmur, click or rub, no peripheral  edema and peripheral pulses strong  ABDOMEN: soft, nontender, no hepatosplenomegaly, no masses and bowel sounds normal  MS: no gross musculoskeletal defects noted, no edema    Diagnostic Test Results:  Orders Placed This Encounter   Procedures     Lipid panel reflex to direct LDL Fasting     Comprehensive metabolic panel     Hemoglobin A1c     CARDIOLOGY EVAL ADULT REFERRAL     EKG 12-lead complete w/read - Clinics     ASSESSMENT/PLAN:     1. Chest pain, unspecified type    - EKG 12-lead complete w/read - Clinics  - CARDIOLOGY EVAL ADULT REFERRAL  Reviewed his EKG results with him.  It shows nonspecific T wave abnormality.  Patient has no chest pain on exertion.  Denies shortness of breath.    2. HTN (hypertension), benign  Stable, continue current medications  - Comprehensive metabolic panel  - CARDIOLOGY EVAL ADULT REFERRAL    3. Dyslipidemia    - Lipid panel reflex to direct LDL Fasting    4. Glucose intolerance (impaired glucose tolerance)    - Hemoglobin A1c    5. Stable angina (H)    - CARDIOLOGY EVAL ADULT REFERRAL    Follow up in 6 months for complete exam.    There are no Patient Instructions on file for this visit.    Ishan Patino MD  [facility]

## 2019-05-17 LAB
ALBUMIN SERPL-MCNC: 4 G/DL (ref 3.4–5)
ALP SERPL-CCNC: 97 U/L (ref 40–150)
ALT SERPL W P-5'-P-CCNC: 23 U/L (ref 0–70)
ANION GAP SERPL CALCULATED.3IONS-SCNC: 7 MMOL/L (ref 3–14)
AST SERPL W P-5'-P-CCNC: 11 U/L (ref 0–45)
BILIRUB SERPL-MCNC: 0.5 MG/DL (ref 0.2–1.3)
BUN SERPL-MCNC: 11 MG/DL (ref 7–30)
CALCIUM SERPL-MCNC: 9 MG/DL (ref 8.5–10.1)
CHLORIDE SERPL-SCNC: 107 MMOL/L (ref 94–109)
CHOLEST SERPL-MCNC: 163 MG/DL
CO2 SERPL-SCNC: 27 MMOL/L (ref 20–32)
CREAT SERPL-MCNC: 0.92 MG/DL (ref 0.66–1.25)
GFR SERPL CREATININE-BSD FRML MDRD: 85 ML/MIN/{1.73_M2}
GLUCOSE SERPL-MCNC: 95 MG/DL (ref 70–99)
HDLC SERPL-MCNC: 49 MG/DL
LDLC SERPL CALC-MCNC: 100 MG/DL
NONHDLC SERPL-MCNC: 114 MG/DL
POTASSIUM SERPL-SCNC: 4 MMOL/L (ref 3.4–5.3)
PROT SERPL-MCNC: 7.2 G/DL (ref 6.8–8.8)
SODIUM SERPL-SCNC: 141 MMOL/L (ref 133–144)
TRIGL SERPL-MCNC: 71 MG/DL

## 2019-06-14 ENCOUNTER — OFFICE VISIT (OUTPATIENT)
Dept: CARDIOLOGY | Facility: CLINIC | Age: 69
End: 2019-06-14
Attending: FAMILY MEDICINE
Payer: MEDICARE

## 2019-06-14 VITALS
HEART RATE: 62 BPM | HEIGHT: 65 IN | SYSTOLIC BLOOD PRESSURE: 155 MMHG | BODY MASS INDEX: 27.49 KG/M2 | DIASTOLIC BLOOD PRESSURE: 93 MMHG | WEIGHT: 165 LBS

## 2019-06-14 DIAGNOSIS — I20.89 STABLE ANGINA (H): ICD-10-CM

## 2019-06-14 DIAGNOSIS — I10 HTN (HYPERTENSION), BENIGN: ICD-10-CM

## 2019-06-14 DIAGNOSIS — R07.2 PRECORDIAL PAIN: Primary | ICD-10-CM

## 2019-06-14 PROCEDURE — 99204 OFFICE O/P NEW MOD 45 MIN: CPT | Performed by: INTERNAL MEDICINE

## 2019-06-14 ASSESSMENT — MIFFLIN-ST. JEOR: SCORE: 1442.38

## 2019-06-14 NOTE — PROGRESS NOTES
Service Date: 06/14/2019      PRIMARY CARE PHYSICIAN:  Dr. Darron Patino.      HISTORY OF PRESENT ILLNESS:  I had the pleasure of seeing your patient, Chris Otero, at [facility] for evaluation of intermittent left-sided chest discomfort.  This patient notes that he had a stress test 3 years ago for chest discomfort.  Since April the chest discomfort has become stronger.  It comes and goes but it occurs more frequently since April.  He feels as though, periodically, he has needles pinching his chest.  He says sublingual nitroglycerin helps.  He will have some chest heaviness at rest with radiation to the left side of his chest but not to his arms, jaw or back.  He denies nausea, vomiting or diaphoresis.  He denies shortness of breath, PND or orthopnea.  No palpitations, syncope or presyncope.  He has a history of hypertension but no diabetes.  He exercises by walking and sometimes has his symptoms and other times can walk free of symptoms.  He is a retired .  His only previous surgery is ORIF of his left leg.  He has no family history of heart disease.  Mother lived until 110, father 116.  He is a nonsmoker.  He denies PND, orthopnea or peripheral edema.      The patient denies previous myocardial infarction or stroke.      PHYSICAL EXAMINATION:  As listed below.      EKG personally reviewed with this patient is normal.      I reviewed his laboratory tests from 05/16/2019 all of which were normal.  Hemoglobin A1c is borderline high at 5.9%.  LDL was 100, total cholesterol 163, HDL 49, triglycerides 71.  Glucose 95.      ASSESSMENT:   1.  Chris Otero is a delightful 69-year-old [place] male who presents for atypical chest pain.  His chest symptoms occur at rest or with exertion.  They are alleviated with sublingual nitroglycerin and rest.  They can last anywhere from seconds to minutes.  He has not had any chest trauma.  Risk factors include hypertension.  He has no family history of  heart disease.  I would perform a stress echocardiogram at this time.  If this is normal I suspect this is musculoskeletal chest pain.   2.  Hypertension.  At least today his blood pressure is poorly controlled.  I reviewed a blood pressure from 2019 where his blood pressure was 147/90.  His blood pressures remained high since 2018.  He is on low-dose medications and I would suggest that he returns to his family physician for further evaluation and treatment.      PLAN:   1.  Stress echocardiogram.  We will hold his Toprol-XL the day before and the day of the test.  He will take all of his other medications as ordered.   2.  Continued exercise as before.   3.  The patient will return to his family physician for further aggressive treatment of his hypertension.  Perhaps increasing his lisinopril or even adding hydrochlorothiazide would be efficacious.      It is my pleasure to assist in the care of Chris Otero.  A [employer]  was present throughout this visit.  All the patient's questions were answered to his satisfaction.      Wicho Hong MD        cc:   [place] [address]         WICHO HONG MD, [facility]             D: 2019   T: 2019   MT: DAT      Name:     CHRIS OTERO   MRN:      [id number]        Account:      [id number]   :      1950           Service Date: 2019      Document: [id number]

## 2019-06-14 NOTE — PROGRESS NOTES
HPI and Plan:   See dictation:[id number]    Orders Placed This Encounter   Procedures     Exercise Stress Echocardiogram       No orders of the defined types were placed in this encounter.      There are no discontinued medications.      Encounter Diagnoses   Name Primary?     Precordial pain Yes     Stable angina (H)      HTN (hypertension), benign        CURRENT MEDICATIONS:  Current Outpatient Medications   Medication Sig Dispense Refill     aspirin (ASA) 81 MG tablet Take 1 tablet (81 mg) by mouth daily 90 tablet 3     atorvastatin (LIPITOR) 80 MG tablet Take 1 tablet (80 mg) by mouth daily 90 tablet 3     lisinopril (PRINIVIL/ZESTRIL) 10 MG tablet Take 1 tablet (10 mg) by mouth daily 90 tablet 3     metoprolol succinate ER (TOPROL-XL) 50 MG 24 hr tablet Take 1 tablet (50 mg) by mouth daily 90 tablet 3     nitroGLYcerin (NITROSTAT) 0.4 MG sublingual tablet Place 1 tablet (0.4 mg) under the tongue every 5 minutes as needed for chest pain For chest pain place 1 tablet under the tongue every 5 minutes for 3 doses. If symptoms persist 5 minutes after 1st dose call 911. 30 tablet 3     omeprazole (PRILOSEC) 20 MG DR capsule Take 1 capsule (20 mg) by mouth daily 90 capsule 3     SUMAtriptan (IMITREX) 50 MG tablet Take 1 tablet (50 mg) by mouth at onset of headache for migraine 20 tablet 6     tamsulosin (FLOMAX) 0.4 MG capsule Take 1 capsule (0.4 mg) by mouth At Bedtime 90 capsule 3     EUCERIN external lotion Apply tid as needed 480 mL 6       ALLERGIES   No Known Allergies    PAST MEDICAL HISTORY:  Past Medical History:   Diagnosis Date     Abnormal EKG      BPH (benign prostatic hyperplasia)      Chest discomfort      Chronic low back pain      Diabetes (H)      Dyslipidemia      GERD (gastroesophageal reflux disease)      Glucose intolerance (impaired glucose tolerance)      HTN (hypertension), benign      Migraine headache with aura      Stable angina (H)        PAST SURGICAL HISTORY:  Past Surgical History:    Procedure Laterality Date     XR ANKLE SURGERY LUIS RIGHT      S/P fx.       FAMILY HISTORY:  Family History   Problem Relation Age of Onset     No Known Problems Mother      No Known Problems Father      No Known Problems Maternal Grandmother      No Known Problems Maternal Grandfather      No Known Problems Paternal Grandmother      No Known Problems Paternal Grandfather      Glaucoma No family hx of      Macular Degeneration No family hx of        SOCIAL HISTORY:  Social History     Socioeconomic History     Marital status:      Spouse name: None     Number of children: None     Years of education: None     Highest education level: None   Occupational History     None   Social Needs     Financial resource strain: None     Food insecurity:     Worry: None     Inability: None     Transportation needs:     Medical: None     Non-medical: None   Tobacco Use     Smoking status: Never Smoker     Smokeless tobacco: Never Used   Substance and Sexual Activity     Alcohol use: No     Frequency: Never     Drug use: No     Sexual activity: Yes     Partners: Female   Lifestyle     Physical activity:     Days per week: None     Minutes per session: None     Stress: None   Relationships     Social connections:     Talks on phone: None     Gets together: None     Attends [employer] service: None     Active member of club or organization: None     Attends meetings of clubs or organizations: None     Relationship status: None     Intimate partner violence:     Fear of current or ex partner: None     Emotionally abused: None     Physically abused: None     Forced sexual activity: None   Other Topics Concern     Parent/sibling w/ CABG, MI or angioplasty before 65F 55M? Not Asked   Social History Narrative     None       Review of Systems:  Skin:  Positive for itching sometimes   Eyes:  Positive for glasses    ENT:  Negative      Respiratory:  Negative       Cardiovascular:    chest pain;Positive  "for;palpitations;lightheadedness occasional palpitations, chest pain comes and goes  Gastroenterology: Positive for heartburn;nausea    Genitourinary:  not assessed      Musculoskeletal:  not assessed      Neurologic:  Positive for migraine headaches    Psychiatric:  not assessed      Heme/Lymph/Imm:  Negative      Endocrine:  Negative        Physical Exam:  Vitals: BP (!) 155/93   Pulse 62   Ht 1.638 m (5' 4.5\")   Wt 74.8 kg (165 lb)   BMI 27.88 kg/m      Constitutional:  cooperative, alert and oriented, well developed, well nourished, in no acute distress        Skin:  warm and dry to the touch, no apparent skin lesions or masses noted          Head:  normocephalic, no masses or lesions        Eyes:  pupils equal and round, conjunctivae and lids unremarkable, sclera white, no xanthalasma, EOMS intact, no nystagmus        Lymph:No Cervical lymphadenopathy present     ENT:  no pallor or cyanosis, dentition good        Neck:  carotid pulses are full and equal bilaterally, JVP normal, no carotid bruit        Respiratory:  normal breath sounds, clear to auscultation, normal A-P diameter, normal symmetry, normal respiratory excursion, no use of accessory muscles         Cardiac: regular rhythm, normal S1/S2, no S3 or S4, apical impulse not displaced, no murmurs, gallops or rubs                pulses full and equal, no bruits auscultated                                        GI:  abdomen soft, non-tender, BS normoactive, no mass, no HSM, no bruits        Extremities and Muscular Skeletal:  no deformities, clubbing, cyanosis, erythema observed;no edema              Neurological:  no gross motor deficits;affect appropriate        Psych:  Alert and Oriented x 3        CC  Ishan Patino MD  [address] [place] [address]              "

## 2019-06-14 NOTE — LETTER
6/14/2019    Ishan Patino MD  [address] [place], [place]. \[facility].\"" [id number]    RE: Bakzuleika Wuikh       Dear Colleague,    I had the pleasure of seeing Chris  in the [facility].    HPI and Plan:   See dictation:[id number]    Orders Placed This Encounter   Procedures     Exercise Stress Echocardiogram       No orders of the defined types were placed in this encounter.      There are no discontinued medications.      Encounter Diagnoses   Name Primary?     Precordial pain Yes     Stable angina (H)      HTN (hypertension), benign        CURRENT MEDICATIONS:  Current Outpatient Medications   Medication Sig Dispense Refill     aspirin (ASA) 81 MG tablet Take 1 tablet (81 mg) by mouth daily 90 tablet 3     atorvastatin (LIPITOR) 80 MG tablet Take 1 tablet (80 mg) by mouth daily 90 tablet 3     lisinopril (PRINIVIL/ZESTRIL) 10 MG tablet Take 1 tablet (10 mg) by mouth daily 90 tablet 3     metoprolol succinate ER (TOPROL-XL) 50 MG 24 hr tablet Take 1 tablet (50 mg) by mouth daily 90 tablet 3     nitroGLYcerin (NITROSTAT) 0.4 MG sublingual tablet Place 1 tablet (0.4 mg) under the tongue every 5 minutes as needed for chest pain For chest pain place 1 tablet under the tongue every 5 minutes for 3 doses. If symptoms persist 5 minutes after 1st dose call 911. 30 tablet 3     omeprazole (PRILOSEC) 20 MG DR capsule Take 1 capsule (20 mg) by mouth daily 90 capsule 3     SUMAtriptan (IMITREX) 50 MG tablet Take 1 tablet (50 mg) by mouth at onset of headache for migraine 20 tablet 6     tamsulosin (FLOMAX) 0.4 MG capsule Take 1 capsule (0.4 mg) by mouth At Bedtime 90 capsule 3     EUCERIN external lotion Apply tid as needed 480 mL 6       ALLERGIES   No Known Allergies    PAST MEDICAL HISTORY:  Past Medical History:   Diagnosis Date     Abnormal EKG      BPH (benign prostatic hyperplasia)      Chest discomfort      Chronic low back pain      Diabetes (H)      Dyslipidemia      GERD  (gastroesophageal reflux disease)      Glucose intolerance (impaired glucose tolerance)      HTN (hypertension), benign      Migraine headache with aura      Stable angina (H)        PAST SURGICAL HISTORY:  Past Surgical History:   Procedure Laterality Date     XR ANKLE SURGERY LUIS RIGHT      S/P fx.       FAMILY HISTORY:  Family History   Problem Relation Age of Onset     No Known Problems Mother      No Known Problems Father      No Known Problems Maternal Grandmother      No Known Problems Maternal Grandfather      No Known Problems Paternal Grandmother      No Known Problems Paternal Grandfather      Glaucoma No family hx of      Macular Degeneration No family hx of        SOCIAL HISTORY:  Social History     Socioeconomic History     Marital status:      Spouse name: None     Number of children: None     Years of education: None     Highest education level: None   Occupational History     None   Social Needs     Financial resource strain: None     Food insecurity:     Worry: None     Inability: None     Transportation needs:     Medical: None     Non-medical: None   Tobacco Use     Smoking status: Never Smoker     Smokeless tobacco: Never Used   Substance and Sexual Activity     Alcohol use: No     Frequency: Never     Drug use: No     Sexual activity: Yes     Partners: Female   Lifestyle     Physical activity:     Days per week: None     Minutes per session: None     Stress: None   Relationships     Social connections:     Talks on phone: None     Gets together: None     Attends [employer] service: None     Active member of club or organization: None     Attends meetings of clubs or organizations: None     Relationship status: None     Intimate partner violence:     Fear of current or ex partner: None     Emotionally abused: None     Physically abused: None     Forced sexual activity: None   Other Topics Concern     Parent/sibling w/ CABG, MI or angioplasty before 65F 55M? Not Asked   Social History  "Narrative     None       Review of Systems:  Skin:  Positive for itching sometimes   Eyes:  Positive for glasses    ENT:  Negative      Respiratory:  Negative       Cardiovascular:    chest pain;Positive for;palpitations;lightheadedness occasional palpitations, chest pain comes and goes  Gastroenterology: Positive for heartburn;nausea    Genitourinary:  not assessed      Musculoskeletal:  not assessed      Neurologic:  Positive for migraine headaches    Psychiatric:  not assessed      Heme/Lymph/Imm:  Negative      Endocrine:  Negative        Physical Exam:  Vitals: BP (!) 155/93   Pulse 62   Ht 1.638 m (5' 4.5\")   Wt 74.8 kg (165 lb)   BMI 27.88 kg/m       Constitutional:  cooperative, alert and oriented, well developed, well nourished, in no acute distress        Skin:  warm and dry to the touch, no apparent skin lesions or masses noted          Head:  normocephalic, no masses or lesions        Eyes:  pupils equal and round, conjunctivae and lids unremarkable, sclera white, no xanthalasma, EOMS intact, no nystagmus        Lymph:No Cervical lymphadenopathy present     ENT:  no pallor or cyanosis, dentition good        Neck:  carotid pulses are full and equal bilaterally, JVP normal, no carotid bruit        Respiratory:  normal breath sounds, clear to auscultation, normal A-P diameter, normal symmetry, normal respiratory excursion, no use of accessory muscles         Cardiac: regular rhythm, normal S1/S2, no S3 or S4, apical impulse not displaced, no murmurs, gallops or rubs                pulses full and equal, no bruits auscultated                                        GI:  abdomen soft, non-tender, BS normoactive, no mass, no HSM, no bruits        Extremities and Muscular Skeletal:  no deformities, clubbing, cyanosis, erythema observed;no edema              Neurological:  no gross motor deficits;affect appropriate        Psych:  Alert and Oriented x 3        CC  Ishan Patino MD  [address] [place] " [place] [address]                Thank you for allowing me to participate in the care of your patient.      Sincerely,     Dallin Akers MD     [facility]    cc:   Ishan Patino MD  [address] [place] [id number]

## 2019-06-14 NOTE — LETTER
6/14/2019      Ishan Patino MD  [address] [facility] [address]      RE: Chris Otero       Dear Colleague,    I had the pleasure of seeing Chris Otero in the [facility].    Service Date: 06/14/2019      PRIMARY CARE PHYSICIAN:  Dr. Darron Patino.      HISTORY OF PRESENT ILLNESS:  I had the pleasure of seeing your patient, Chris Otero, at [facility] for evaluation of intermittent left-sided chest discomfort.  This patient notes that he had a stress test 3 years ago for chest discomfort.  Since April the chest discomfort has become stronger.  It comes and goes but it occurs more frequently since April.  He feels as though, periodically, he has needles pinching his chest.  He says sublingual nitroglycerin helps.  He will have some chest heaviness at rest with radiation to the left side of his chest but not to his arms, jaw or back.  He denies nausea, vomiting or diaphoresis.  He denies shortness of breath, PND or orthopnea.  No palpitations, syncope or presyncope.  He has a history of hypertension but no diabetes.  He exercises by walking and sometimes has his symptoms and other times can walk free of symptoms.  He is a retired .  His only previous surgery is ORIF of his left leg.  He has no family history of heart disease.  Mother lived until 110, father 116.  He is a nonsmoker.  He denies PND, orthopnea or peripheral edema.      The patient denies previous myocardial infarction or stroke.      PHYSICAL EXAMINATION:  As listed below.      EKG personally reviewed with this patient is normal.      I reviewed his laboratory tests from 05/16/2019 all of which were normal.  Hemoglobin A1c is borderline high at 5.9%.  LDL was 100, total cholesterol 163, HDL 49, triglycerides 71.  Glucose 95.      ASSESSMENT:   1.  Chris Otero is a delightful 69-year-old [place] male who presents for atypical chest pain.  His chest symptoms occur at rest  or with exertion.  They are alleviated with sublingual nitroglycerin and rest.  They can last anywhere from seconds to minutes.  He has not had any chest trauma.  Risk factors include hypertension.  He has no family history of heart disease.  I would perform a stress echocardiogram at this time.  If this is normal I suspect this is musculoskeletal chest pain.   2.  Hypertension.  At least today his blood pressure is poorly controlled.  I reviewed a blood pressure from 2019 where his blood pressure was 147/90.  His blood pressures remained high since 2018.  He is on low-dose medications and I would suggest that he returns to his family physician for further evaluation and treatment.      PLAN:   1.  Stress echocardiogram.  We will hold his Toprol-XL the day before and the day of the test.  He will take all of his other medications as ordered.   2.  Continued exercise as before.   3.  The patient will return to his family physician for further aggressive treatment of his hypertension.  Perhaps increasing his lisinopril or even adding hydrochlorothiazide would be efficacious.      It is my pleasure to assist in the care of Chris Otero.  A [facility]  was present throughout this visit.  All the patient's questions were answered to his satisfaction.      Wicho Hong MD        cc:   [place] [address]         WICHO HONG MD, [facility]             D: 2019   T: 2019   MT: DAT      Name:     CHRIS OTERO   MRN:      [id number]        Account:      [id number]   :      1950           Service Date: 2019      Document: [id number]           Outpatient Encounter Medications as of 2019   Medication Sig Dispense Refill     aspirin (ASA) 81 MG tablet Take 1 tablet (81 mg) by mouth daily 90 tablet 3     atorvastatin (LIPITOR) 80 MG tablet Take 1 tablet (80 mg) by mouth daily 90 tablet 3      lisinopril (PRINIVIL/ZESTRIL) 10 MG tablet Take 1 tablet (10 mg) by mouth daily 90 tablet 3     metoprolol succinate ER (TOPROL-XL) 50 MG 24 hr tablet Take 1 tablet (50 mg) by mouth daily 90 tablet 3     nitroGLYcerin (NITROSTAT) 0.4 MG sublingual tablet Place 1 tablet (0.4 mg) under the tongue every 5 minutes as needed for chest pain For chest pain place 1 tablet under the tongue every 5 minutes for 3 doses. If symptoms persist 5 minutes after 1st dose call 911. 30 tablet 3     omeprazole (PRILOSEC) 20 MG DR capsule Take 1 capsule (20 mg) by mouth daily 90 capsule 3     SUMAtriptan (IMITREX) 50 MG tablet Take 1 tablet (50 mg) by mouth at onset of headache for migraine 20 tablet 6     tamsulosin (FLOMAX) 0.4 MG capsule Take 1 capsule (0.4 mg) by mouth At Bedtime 90 capsule 3     EUCERIN external lotion Apply tid as needed 480 mL 6     No facility-administered encounter medications on file as of 6/14/2019.        Again, thank you for allowing me to participate in the care of your patient.      Sincerely,    Dallin Akers MD     [facility]

## 2019-06-18 ENCOUNTER — HOSPITAL ENCOUNTER (OUTPATIENT)
Dept: CARDIOLOGY | Facility: CLINIC | Age: 69
Discharge: HOME | End: 2019-06-18
Attending: INTERNAL MEDICINE | Admitting: INTERNAL MEDICINE
Payer: MEDICARE

## 2019-06-18 DIAGNOSIS — R07.2 PRECORDIAL PAIN: ICD-10-CM

## 2019-06-18 PROCEDURE — 93350 STRESS TTE ONLY: CPT | Mod: 26 | Performed by: INTERNAL MEDICINE

## 2019-06-18 PROCEDURE — 93325 DOPPLER ECHO COLOR FLOW MAPG: CPT | Mod: TC

## 2019-06-18 PROCEDURE — 93016 CV STRESS TEST SUPVJ ONLY: CPT | Performed by: INTERNAL MEDICINE

## 2019-06-18 PROCEDURE — 93325 DOPPLER ECHO COLOR FLOW MAPG: CPT | Mod: 26 | Performed by: INTERNAL MEDICINE

## 2019-06-18 PROCEDURE — 93321 DOPPLER ECHO F-UP/LMTD STD: CPT | Mod: 26 | Performed by: INTERNAL MEDICINE

## 2019-06-18 PROCEDURE — 93018 CV STRESS TEST I&R ONLY: CPT | Performed by: INTERNAL MEDICINE

## 2019-06-24 ENCOUNTER — TELEPHONE (OUTPATIENT)
Dept: CARDIOLOGY | Facility: CLINIC | Age: 69
End: 2019-06-24

## 2019-06-24 NOTE — TELEPHONE ENCOUNTER
Spoke with patient through a [facility]  about stress test results and Dr. Akers's comments. Patient had no further questions.

## 2019-06-24 NOTE — TELEPHONE ENCOUNTER
RN called patient and left VM advising patient to call back to discuss Stress ECHO results and recommendations.    The above message was left utilizing a [employer] .       ----- Message from Dallin Akers MD sent at 6/23/2019 10:33 PM CDT -----  Normal stress echo.  Would suggest aggressive antihypertension treatment through his PMD.  F/U with me prn.  Dallin Akers MD, FACC  June 23, 2019 10:33 PM

## 2020-01-17 DIAGNOSIS — I10 BENIGN ESSENTIAL HYPERTENSION: ICD-10-CM

## 2020-01-17 DIAGNOSIS — G43.109 MIGRAINE WITH AURA AND WITHOUT STATUS MIGRAINOSUS, NOT INTRACTABLE: ICD-10-CM

## 2020-01-17 DIAGNOSIS — K21.9 GASTROESOPHAGEAL REFLUX DISEASE WITHOUT ESOPHAGITIS: ICD-10-CM

## 2020-01-17 DIAGNOSIS — N40.0 BENIGN PROSTATIC HYPERPLASIA WITHOUT LOWER URINARY TRACT SYMPTOMS: ICD-10-CM

## 2020-01-17 DIAGNOSIS — E78.5 DYSLIPIDEMIA: ICD-10-CM

## 2020-01-17 DIAGNOSIS — I20.89 STABLE ANGINA (H): ICD-10-CM

## 2020-01-17 DIAGNOSIS — Z76.0 ENCOUNTER FOR MEDICATION REFILL: ICD-10-CM

## 2020-01-17 NOTE — TELEPHONE ENCOUNTER
"Requested Prescriptions   Pending Prescriptions Disp Refills     tamsulosin (FLOMAX) 0.4 MG capsule 90 capsule 3     Sig: Take 1 capsule (0.4 mg) by mouth At Bedtime   Last Written Prescription Date:  12/14/18  Last Fill Quantity: 90,  # refills: 3   Last office visit: 5/16/2019 with prescribing provider:     Future Office Visit:        Alpha Blockers Failed - 1/17/2020  9:35 AM        Failed - Blood pressure under 140/90 in past 12 months     BP Readings from Last 3 Encounters:   06/14/19 (!) 155/93   05/16/19 147/90   02/13/19 143/85                 Passed - Recent (12 mo) or future (30 days) visit within the authorizing provider's specialty     Patient has had an office visit with the authorizing provider or a provider within the authorizing providers department within the previous 12 mos or has a future within next 30 days. See \"Patient Info\" tab in inbasket, or \"Choose Columns\" in Meds & Orders section of the refill encounter.              Passed - Patient does not have Tadalafil, Vardenafil, or Sildenafil on their medication list        Passed - Medication is active on med list        Passed - Patient is 18 years of age or older        atorvastatin (LIPITOR) 80 MG tablet 90 tablet 3     Sig: Take 1 tablet (80 mg) by mouth daily   Last Written Prescription Date:  12/14/18  Last Fill Quantity: 90,  # refills: 3   Last office visit: 5/16/2019 with prescribing provider:     Future Office Visit:        Statins Protocol Passed - 1/17/2020  9:35 AM        Passed - LDL on file in past 12 months     Recent Labs   Lab Test 05/16/19  1721   *             Passed - No abnormal creatine kinase in past 12 months     No lab results found.             Passed - Recent (12 mo) or future (30 days) visit within the authorizing provider's specialty     Patient has had an office visit with the authorizing provider or a provider within the authorizing providers department within the previous 12 mos or has a future within next " "30 days. See \"Patient Info\" tab in inbasket, or \"Choose Columns\" in Meds & Orders section of the refill encounter.              Passed - Medication is active on med list        Passed - Patient is age 18 or older        lisinopril (PRINIVIL/ZESTRIL) 10 MG tablet 90 tablet 3     Sig: Take 1 tablet (10 mg) by mouth daily   Last Written Prescription Date:  12/14/18  Last Fill Quantity: 90,  # refills: 3   Last office visit: 5/16/2019 with prescribing provider:     Future Office Visit:        ACE Inhibitors (Including Combos) Protocol Failed - 1/17/2020  9:35 AM        Failed - Blood pressure under 140/90 in past 12 months     BP Readings from Last 3 Encounters:   06/14/19 (!) 155/93   05/16/19 147/90   02/13/19 143/85                 Passed - Recent (12 mo) or future (30 days) visit within the authorizing provider's specialty     Patient has had an office visit with the authorizing provider or a provider within the authorizing providers department within the previous 12 mos or has a future within next 30 days. See \"Patient Info\" tab in inbasket, or \"Choose Columns\" in Meds & Orders section of the refill encounter.              Passed - Medication is active on med list        Passed - Patient is age 18 or older        Passed - Normal serum creatinine on file in past 12 months     Recent Labs   Lab Test 05/16/19  1721   CR 0.92             Passed - Normal serum potassium on file in past 12 months     Recent Labs   Lab Test 05/16/19  1721   POTASSIUM 4.0             metoprolol succinate ER (TOPROL-XL) 50 MG 24 hr tablet 90 tablet 3     Sig: Take 1 tablet (50 mg) by mouth daily   Last Written Prescription Date:  12/14/18  Last Fill Quantity: 90,  # refills: 3   Last office visit: 5/16/2019 with prescribing provider:     Future Office Visit:        Beta-Blockers Protocol Failed - 1/17/2020  9:35 AM        Failed - Blood pressure under 140/90 in past 12 months     BP Readings from Last 3 Encounters:   06/14/19 (!) 155/93 " "  05/16/19 147/90   02/13/19 143/85                 Passed - Patient is age 6 or older        Passed - Recent (12 mo) or future (30 days) visit within the authorizing provider's specialty     Patient has had an office visit with the authorizing provider or a provider within the authorizing providers department within the previous 12 mos or has a future within next 30 days. See \"Patient Info\" tab in inbasket, or \"Choose Columns\" in Meds & Orders section of the refill encounter.              Passed - Medication is active on med list        nitroGLYcerin (NITROSTAT) 0.4 MG sublingual tablet 30 tablet 3     Sig: Place 1 tablet (0.4 mg) under the tongue every 5 minutes as needed for chest pain For chest pain place 1 tablet under the tongue every 5 minutes for 3 doses. If symptoms persist 5 minutes after 1st dose call 911.   Last Written Prescription Date:  12/14/18  Last Fill Quantity: 30,  # refills: 3   Last office visit: 5/16/2019 with prescribing provider:     Future Office Visit:        Nitrates Failed - 1/17/2020  9:35 AM        Failed - Blood pressure under 140/90 in past 12 months     BP Readings from Last 3 Encounters:   06/14/19 (!) 155/93   05/16/19 147/90   02/13/19 143/85                 Failed - Sublingual nitro order needs review     If refill exceeds 1 bottle per month, please forward request to provider.           Passed - Pt is not on erectile dysfunction medications        Passed - Recent (12 mo) or future (30 days) visit within the authorizing provider's specialty     Patient has had an office visit with the authorizing provider or a provider within the authorizing providers department within the previous 12 mos or has a future within next 30 days. See \"Patient Info\" tab in inbasket, or \"Choose Columns\" in Meds & Orders section of the refill encounter.              Passed - Medication is active on med list        Passed - Patient is age 18 or older        omeprazole (PRILOSEC) 20 MG DR capsule 90 " "capsule 3     Sig: Take 1 capsule (20 mg) by mouth daily   Last Written Prescription Date:  12/14/18  Last Fill Quantity: 90,  # refills: 3   Last office visit: 5/16/2019 with prescribing provider:     Future Office Visit:        PPI Protocol Passed - 1/17/2020  9:35 AM        Passed - Not on Clopidogrel (unless Pantoprazole ordered)        Passed - No diagnosis of osteoporosis on record        Passed - Recent (12 mo) or future (30 days) visit within the authorizing provider's specialty     Patient has had an office visit with the authorizing provider or a provider within the authorizing providers department within the previous 12 mos or has a future within next 30 days. See \"Patient Info\" tab in inbasket, or \"Choose Columns\" in Meds & Orders section of the refill encounter.              Passed - Medication is active on med list        Passed - Patient is age 18 or older          "

## 2020-01-20 RX ORDER — TAMSULOSIN HYDROCHLORIDE 0.4 MG/1
0.4 CAPSULE ORAL AT BEDTIME
Qty: 90 CAPSULE | Refills: 3 | Status: SHIPPED | OUTPATIENT
Start: 2020-01-20

## 2020-01-20 RX ORDER — ATORVASTATIN CALCIUM 80 MG/1
80 TABLET, FILM COATED ORAL DAILY
Qty: 90 TABLET | Refills: 3 | Status: SHIPPED | OUTPATIENT
Start: 2020-01-20

## 2020-01-20 RX ORDER — METOPROLOL SUCCINATE 50 MG/1
50 TABLET, EXTENDED RELEASE ORAL DAILY
Qty: 90 TABLET | Refills: 3 | Status: SHIPPED | OUTPATIENT
Start: 2020-01-20

## 2020-01-20 RX ORDER — NITROGLYCERIN 0.4 MG/1
0.4 TABLET SUBLINGUAL EVERY 5 MIN PRN
Qty: 30 TABLET | Refills: 3 | Status: SHIPPED | OUTPATIENT
Start: 2020-01-20 | End: 2020-01-22

## 2020-01-20 RX ORDER — LISINOPRIL 10 MG/1
10 TABLET ORAL DAILY
Qty: 90 TABLET | Refills: 3 | Status: SHIPPED | OUTPATIENT
Start: 2020-01-20 | End: 2023-05-08 | Stop reason: DRUGHIGH

## 2020-01-20 NOTE — TELEPHONE ENCOUNTER
Routing refill request to provider for review/approval because:  Failed protocol: blood pressure    Keyla Kenyon RN

## 2020-01-21 DIAGNOSIS — I20.89 STABLE ANGINA (H): ICD-10-CM

## 2020-01-21 DIAGNOSIS — Z76.0 ENCOUNTER FOR MEDICATION REFILL: ICD-10-CM

## 2020-01-21 NOTE — TELEPHONE ENCOUNTER
"Requested Prescriptions   Pending Prescriptions Disp Refills     nitroGLYcerin (NITROSTAT) 0.4 MG sublingual tablet 30 tablet 3     Sig: Place 1 tablet (0.4 mg) under the tongue every 5 minutes as needed for chest pain For chest pain place 1 tablet under the tongue every 5 minutes for 3 doses. If symptoms persist 5 minutes after 1st dose call 911.   Last Written Prescription Date:  1/20/2020  Last Fill Quantity: 30,  # refills: 3   Last office visit: 5/16/2019 with prescribing provider:     Future Office Visit:        Nitrates Failed - 1/21/2020 10:29 AM        Failed - Blood pressure under 140/90 in past 12 months     BP Readings from Last 3 Encounters:   06/14/19 (!) 155/93   05/16/19 147/90   02/13/19 143/85                 Failed - Sublingual nitro order needs review     If refill exceeds 1 bottle per month, please forward request to provider.           Passed - Pt is not on erectile dysfunction medications        Passed - Recent (12 mo) or future (30 days) visit within the authorizing provider's specialty     Patient has had an office visit with the authorizing provider or a provider within the authorizing providers department within the previous 12 mos or has a future within next 30 days. See \"Patient Info\" tab in inbasket, or \"Choose Columns\" in Meds & Orders section of the refill encounter.              Passed - Medication is active on med list        Passed - Patient is age 18 or older          "

## 2020-01-22 RX ORDER — NITROGLYCERIN 0.4 MG/1
0.4 TABLET SUBLINGUAL EVERY 5 MIN PRN
Qty: 30 TABLET | Refills: 3 | Status: SHIPPED | OUTPATIENT
Start: 2020-01-22

## 2020-01-22 NOTE — TELEPHONE ENCOUNTER
"Medication filled 1/20/20 as \"local print\"     No notes in chart about sending/faxing printed Rx.     Routed to PCP - resign electronic request?     Suni Wharton, RN, BSN, PHN  [facility]: [place]    "

## 2022-10-26 ENCOUNTER — ANCILLARY PROCEDURE (OUTPATIENT)
Dept: CT IMAGING | Facility: CLINIC | Age: 72
End: 2022-10-26
Attending: INTERNAL MEDICINE
Payer: COMMERCIAL

## 2022-10-26 DIAGNOSIS — D72.820 LYMPHOCYTOSIS: ICD-10-CM

## 2022-10-26 LAB
CREAT BLD-MCNC: 0.9 MG/DL (ref 0.7–1.3)
GFR SERPL CREATININE-BSD FRML MDRD: >60 ML/MIN/1.73M2

## 2022-10-26 PROCEDURE — 74177 CT ABD & PELVIS W/CONTRAST: CPT

## 2022-10-26 PROCEDURE — 255N000002 HC RX 255 OP 636: Performed by: INTERNAL MEDICINE

## 2022-10-26 PROCEDURE — 82565 ASSAY OF CREATININE: CPT

## 2022-10-26 RX ADMIN — IOHEXOL 100 ML: 350 INJECTION, SOLUTION INTRAVENOUS at 12:47

## 2023-04-25 ENCOUNTER — TRANSFERRED RECORDS (OUTPATIENT)
Dept: HEALTH INFORMATION MANAGEMENT | Facility: CLINIC | Age: 73
End: 2023-04-25
Payer: COMMERCIAL

## 2023-05-08 ENCOUNTER — OFFICE VISIT (OUTPATIENT)
Dept: INTERNAL MEDICINE | Facility: CLINIC | Age: 73
End: 2023-05-08
Payer: COMMERCIAL

## 2023-05-08 ENCOUNTER — ANESTHESIA EVENT (OUTPATIENT)
Dept: GASTROENTEROLOGY | Facility: CLINIC | Age: 73
End: 2023-05-08
Payer: COMMERCIAL

## 2023-05-08 VITALS
HEART RATE: 62 BPM | TEMPERATURE: 97.5 F | BODY MASS INDEX: 27.69 KG/M2 | RESPIRATION RATE: 16 BRPM | OXYGEN SATURATION: 95 % | HEIGHT: 65 IN | SYSTOLIC BLOOD PRESSURE: 114 MMHG | WEIGHT: 166.2 LBS | DIASTOLIC BLOOD PRESSURE: 68 MMHG

## 2023-05-08 DIAGNOSIS — I82.4Y1 DVT, LOWER EXTREMITY, PROXIMAL, ACUTE, RIGHT (H): ICD-10-CM

## 2023-05-08 DIAGNOSIS — C91.10 CHRONIC LYMPHOCYTIC LEUKEMIA (H): ICD-10-CM

## 2023-05-08 DIAGNOSIS — I10 HTN (HYPERTENSION), BENIGN: ICD-10-CM

## 2023-05-08 DIAGNOSIS — Z01.818 PREOP GENERAL PHYSICAL EXAM: Primary | ICD-10-CM

## 2023-05-08 PROBLEM — H25.813 COMBINED FORMS OF AGE-RELATED CATARACT OF BOTH EYES: Status: ACTIVE | Noted: 2019-12-19

## 2023-05-08 PROCEDURE — 99204 OFFICE O/P NEW MOD 45 MIN: CPT | Performed by: INTERNAL MEDICINE

## 2023-05-08 RX ORDER — OMEPRAZOLE 40 MG/1
CAPSULE, DELAYED RELEASE ORAL
COMMUNITY
Start: 2022-11-08

## 2023-05-08 RX ORDER — ASCORBIC ACID 100 MG
1 TABLET,CHEWABLE ORAL DAILY
COMMUNITY

## 2023-05-08 RX ORDER — ACETAMINOPHEN 500 MG
1000 TABLET ORAL PRN
COMMUNITY
Start: 2023-04-05

## 2023-05-08 RX ORDER — OLOPATADINE HYDROCHLORIDE 1 MG/ML
SOLUTION/ DROPS OPHTHALMIC
COMMUNITY
Start: 2023-03-21

## 2023-05-08 RX ORDER — MOMETASONE FUROATE MONOHYDRATE 50 UG/1
SPRAY, METERED NASAL
COMMUNITY
Start: 2022-12-18

## 2023-05-08 RX ORDER — LISINOPRIL 40 MG/1
1 TABLET ORAL
COMMUNITY
Start: 2023-04-07

## 2023-05-08 RX ORDER — ENOXAPARIN SODIUM 100 MG/ML
INJECTION SUBCUTANEOUS
COMMUNITY
Start: 2023-04-28

## 2023-05-08 RX ORDER — CETIRIZINE HYDROCHLORIDE 10 MG/1
1 TABLET ORAL
COMMUNITY
Start: 2023-02-22

## 2023-05-08 NOTE — PATIENT INSTRUCTIONS
For informational purposes only. Not to replace the advice of your health care provider. Copyright   2003,  [facility]. All rights reserved. Clinically reviewed by Felicity Hancock MD. [employer] [id number] - REV .  Preparing for Your Surgery  Getting started  A nurse will call you to review your health history and instructions. They will give you an arrival time based on your scheduled surgery time. Please be ready to share:  Your doctor's clinic name and phone number  Your medical, surgical, and anesthesia history  A list of allergies and sensitivities  A list of medicines, including herbal treatments and over-the-counter drugs  Whether the patient has a legal guardian (ask how to send us the papers in advance)  Please tell us if you're pregnant--or if there's any chance you might be pregnant. Some surgeries may injure a fetus (unborn baby), so they require a pregnancy test. Surgeries that are safe for a fetus don't always need a test, and you can choose whether to have one.   If you have a child who's having surgery, please ask for a copy of Preparing for Your Child's Surgery.    Preparing for surgery  Within 10 to 30 days of surgery: Have a pre-op exam (sometimes called an H&P, or History and Physical). This can be done at a clinic or pre-operative center.  If you're having a , you may not need this exam. Talk to your care team.  At your pre-op exam, talk to your care team about all medicines you take. If you need to stop any medicines before surgery, ask when to start taking them again.  We do this for your safety. Many medicines can make you bleed too much during surgery. Some change how well surgery (anesthesia) drugs work.  Call your insurance company to let them know you're having surgery. (If you don't have insurance, call [phone].)  Call your clinic if there's any change in your health. This includes signs of a cold or flu (sore throat, runny nose, cough, rash, fever). It  also includes a scrape or scratch near the surgery site.  If you have questions on the day of surgery, call your hospital or surgery center.  Eating and drinking guidelines  For your safety: Unless your surgeon tells you otherwise, follow the guidelines below.  Eat and drink as usual until 8 hours before you arrive for surgery. After that, no food or milk.  Drink clear liquids until 2 hours before you arrive. These are liquids you can see through, like water, Gatorade, and Propel Water. They also include plain black coffee and tea (no cream or milk), candy, and breath mints. You can spit out gum when you arrive.  If you drink alcohol: Stop drinking it the night before surgery.  If your care team tells you to take medicine on the morning of surgery, it's okay to take it with a sip of water.  Preventing infection  Shower or bathe the night before and morning of your surgery. Follow the instructions your clinic gave you. (If no instructions, use regular soap.)  Don't shave or clip hair near your surgery site. We'll remove the hair if needed.  Don't smoke or vape the morning of surgery. You may chew nicotine gum up to 2 hours before surgery. A nicotine patch is okay.  Note: Some surgeries require you to completely quit smoking and nicotine. Check with your surgeon.  Your care team will make every effort to keep you safe from infection. We will:  Clean our hands often with soap and water (or an alcohol-based hand rub).  Clean the skin at your surgery site with a special soap that kills germs.  Give you a special gown to keep you warm. (Cold raises the risk of infection.)  Wear special hair covers, masks, gowns and gloves during surgery.  Give antibiotic medicine, if prescribed. Not all surgeries need antibiotics.  What to bring on the day of surgery  Photo ID and insurance card  Copy of your health care directive, if you have one  Glasses and hearing aids (bring cases)  You can't wear contacts during surgery  Inhaler and  eye drops, if you use them (tell us about these when you arrive)  CPAP machine or breathing device, if you use them  A few personal items, if spending the night  If you have . . .  A pacemaker, ICD (cardiac defibrillator) or other implant: Bring the ID card.  An implanted stimulator: Bring the remote control.  A legal guardian: Bring a copy of the certified (court-stamped) guardianship papers.  Please remove any jewelry, including body piercings. Leave jewelry and other valuables at home.  If you're going home the day of surgery  You must have a responsible adult drive you home. They should stay with you overnight as well.  If you don't have someone to stay with you, and you aren't safe to go home alone, we may keep you overnight. Insurance often won't pay for this.  After surgery  If it's hard to control your pain or you need more pain medicine, please call your surgeon's office.  Questions?   If you have any questions for your care team, list them here: _________________________________________________________________________________________________________________________________________________________________________ ____________________________________ ____________________________________ ____________________________________    How to Take Your Medication Before Surgery    Continue to hold Eliquis and restart following your bone marrow biopsy tomorrow    Do not give yourself any further Lovenox injections    Avoid aspirin and over-the-counter NSAIDs including ibuprofen, Motrin, Advil and Aleve prior to your procedure    On the morning of your procedure, take your usual dose of metoprolol with a small sip of water.  Hold all of your other medications until later in the day

## 2023-05-08 NOTE — PROGRESS NOTES
[facility]  [address] [address]  Phone: [phone]  Fax: [phone]  Primary Provider: System, Provider Not In  Pre-op Performing Provider:    KATHI WHITTEN  PHONE,       PREOPERATIVE EVALUATION:  Today's date: 5/8/2023    Chris Otero is a 73 year old male who presents for a preoperative evaluation.      5/8/2023     2:55 PM   Additional Questions   Roomed by      Surgical Information:  Surgery/Procedure: Bone Marrow Bx  Surgery Location: [facility]  Surgeon: Dr. Ontiveros  Surgery Date: 5/9/23  Time of Surgery:   Where patient plans to recover: At home with family  Fax number for surgical facility: Note does not need to be faxed, will be available electronically in Epic.    Assessment & Plan     The proposed surgical procedure is considered LOW risk.    Preop general physical exam  73-year-old male here for preop clearance prior to bone marrow biopsy under MAC sedation.  He has tolerated previous surgery and anesthesia without any complications.  Overall risk is low and he is cleared for surgery and anesthesia.  He is avoiding aspirin and NSAIDs and is appropriately holding his Eliquis, using Lovenox as outlined below.    Chronic lymphocytic leukemia (H)  Progression of CLL with plans for bone marrow biopsy    DVT, lower extremity, proximal, acute, right (H)  Recent diagnosis of acute right lower extremity DVT.  He has been on Eliquis for the past 6 weeks.  Holding Eliquis for the 2 days prior to his procedure and has been on Lovenox taking his last dose of it this morning.  We discussed that he should take no further Lovenox injections and should continue to hold his Eliquis until after his procedure.    HTN (hypertension), benign  Blood pressure is well controlled.  I am recommending taking his usual dose of Toprol-XL on the morning of surgery with a small sip of water.  Recommending that he hold lisinopril as he normally  takes this in the afternoon anyway.                 Additional Medication Instructions:  Continue to hold Eliquis and restart following your bone marrow biopsy tomorrow    Do not give yourself any further Lovenox injections    Avoid aspirin and over-the-counter NSAIDs including ibuprofen, Motrin, Advil and Aleve prior to your procedure    On the morning of your procedure, take your usual dose of metoprolol with a small sip of water.  Hold all of your other medications until later in the day    RECOMMENDATION:  APPROVAL GIVEN to proceed with proposed procedure, without further diagnostic evaluation.            Subjective     HPI related to upcoming procedure: 73-year-old male here for preop clearance prior to bone marrow biopsy under MAC sedation.  Recent right lower extremity DVT on Eliquis.  See assessment and plan and details below for further information        5/8/2023     2:48 PM   Preop Questions   1. Have you ever had a heart attack or stroke? No   2. Have you ever had surgery on your heart or blood vessels, such as a stent placement, a coronary artery bypass, or surgery on an artery in your head, neck, heart, or legs? No   3. Do you have chest pain with activity? No   4. Do you have a history of  heart failure? No   5. Do you currently have a cold, bronchitis or symptoms of other infection? No   6. Do you have a cough, shortness of breath, or wheezing? No   7. Do you or anyone in your family have previous history of blood clots? YES -acute right lower extremity DVT March 2023   8. Do you or does anyone in your family have a serious bleeding problem such as prolonged bleeding following surgeries or cuts? No   9. Have you ever had problems with anemia or been told to take iron pills? YES -chronic anemia secondary to CLL   10. Have you had any abnormal blood loss such as black, tarry or bloody stools? No   11. Have you ever had a blood transfusion? No   12. Are you willing to have a blood transfusion if it is  medically needed before, during, or after your surgery? Yes   13. Have you or any of your relatives ever had problems with anesthesia? No   14. Do you have sleep apnea, excessive snoring or daytime drowsiness? No   15. Do you have any artifical heart valves or other implanted medical devices like a pacemaker, defibrillator, or continuous glucose monitor? No   16. Do you have artificial joints? No   17. Are you allergic to latex? No       Health Care Directive:  Patient does not have a Health Care Directive or Living Will:     Preoperative Review of :   reviewed - no record of controlled substances prescribed.          Review of Systems  12 point review of systems is negative other than what is discussed in the assessment and plan.  Specifically no exertional chest pain or increasing shortness of breath.  No abdominal pain.    Patient Active Problem List    Diagnosis Date Noted     DVT, lower extremity, proximal, acute, right (H) 03/27/2023     Priority: Medium     Chronic lymphocytic leukemia (H) 10/03/2022     Priority: Medium     Formatting of this note might be different from the original.  Diagnosed 9/2022, being followed at outside oncology clinic       Combined forms of age-related cataract of both eyes 12/19/2019     Priority: Medium     Formatting of this note might be different from the original.  Added automatically from request for surgery [id number]       Precordial pain 06/14/2019     Priority: Medium     Migraine headache with aura      Priority: Medium     Chronic low back pain      Priority: Medium     Dyslipidemia      Priority: Medium     BPH (benign prostatic hyperplasia)      Priority: Medium     HTN (hypertension), benign      Priority: Medium     Glucose intolerance (impaired glucose tolerance)      Priority: Medium     GERD (gastroesophageal reflux disease)      Priority: Medium      Past Medical History:   Diagnosis Date     Abnormal EKG      BPH (benign prostatic hyperplasia)      Chest  discomfort      Chronic low back pain      Diabetes (H)      Dyslipidemia      GERD (gastroesophageal reflux disease)      Glucose intolerance (impaired glucose tolerance)      HTN (hypertension), benign      Migraine headache with aura      Stable angina (H)      Past Surgical History:   Procedure Laterality Date     XR ANKLE SURGERY LUIS RIGHT      S/P fx.     Current Outpatient Medications   Medication Sig Dispense Refill     acetaminophen (TYLENOL) 500 MG tablet Take 1,000 mg by mouth as needed       apixaban ANTICOAGULANT (ELIQUIS) 5 MG tablet Take 5 mg by mouth 2 times daily       ASPIRIN NOT PRESCRIBED (INTENTIONAL) Please choose reason not prescribed from choices below.       atorvastatin (LIPITOR) 80 MG tablet Take 1 tablet (80 mg) by mouth daily 90 tablet 3     cetirizine (ZYRTEC) 10 MG tablet Take 1 tablet by mouth daily at 2 pm       cholecalciferol 50 MCG (2000 UT) tablet Take 2,000 Units by mouth daily       enoxaparin ANTICOAGULANT (LOVENOX) 60 MG/0.6ML syringe PLEASE SEE ATTACHED FOR DETAILED DIRECTIONS       lisinopril (ZESTRIL) 40 MG tablet Take 1 tablet by mouth daily at 2 pm       metoprolol succinate ER (TOPROL-XL) 50 MG 24 hr tablet Take 1 tablet (50 mg) by mouth daily 90 tablet 3     Multiple Vitamin (QUINTABS) TABS Take 1 tablet by mouth daily       nitroGLYcerin (NITROSTAT) 0.4 MG sublingual tablet Place 1 tablet (0.4 mg) under the tongue every 5 minutes as needed for chest pain If symptoms persist 5 minutes after 1st dose call 911. 30 tablet 3     olopatadine (PATANOL) 0.1 % ophthalmic solution 1 DROP IN EACH AFFECTED EYE 2 TIMES A DAY 30 DAY(S)       omeprazole (PRILOSEC) 40 MG DR capsule TAKE 1 CAPSULE BY MOUTH EVERY DAY IN THE MORNING FOR 90 DAYS       tamsulosin (FLOMAX) 0.4 MG capsule Take 1 capsule (0.4 mg) by mouth At Bedtime 90 capsule 3     mometasone (NASONEX) 50 MCG/ACT nasal spray 2 SPRAYS IN EACH NOSTRIL INTRANASALLY ONCE A DAY 30 DAY(S)         No Known Allergies     Social  "History     Tobacco Use     Smoking status: Never     Smokeless tobacco: Never   Vaping Use     Vaping status: Not on file   Substance Use Topics     Alcohol use: No       History   Drug Use No         Objective     /68 (BP Location: Right arm, Patient Position: Sitting, Cuff Size: Adult Regular)   Pulse 62   Temp 97.5  F (36.4  C) (Oral)   Resp 16   Ht 1.638 m (5' 4.5\")   Wt 75.4 kg (166 lb 3.2 oz)   SpO2 95%   BMI 28.09 kg/m      Physical Exam  GENERAL APPEARANCE: healthy, alert and no distress  HENT: Normal  Neck with cervical lymphadenopathy  RESP: lungs clear to auscultation - no rales, rhonchi or wheezes  CV: regular rate and rhythm, normal S1 S2, no S3 or S4 and no murmur, click or rub.  No carotid bruits  ABDOMEN: soft, nontender, no HSM or masses and bowel sounds normal  NEURO: Normal strength and tone, sensory exam grossly normal, mentation intact and speech normal    Recent Labs   Lab Test 10/26/22  1248   CR 0.9        Diagnostics:  No labs were ordered during this visit.   No EKG required for low risk surgery (cataract, skin procedure, breast biopsy, etc).    Revised Cardiac Risk Index (RCRI):  The patient has the following serious cardiovascular risks for perioperative complications:   - No serious cardiac risks = 0 points     RCRI Interpretation: 0 points: Class I (very low risk - 0.4% complication rate)           Signed Electronically by: Seymour Arnold MD  Copy of this evaluation report is provided to requesting physician.      "

## 2023-05-08 NOTE — H&P (VIEW-ONLY)
[facility]  [address] [facility] [address]  Phone: [phone]  Fax: [phone]  Primary Provider: System, Provider Not In  Pre-op Performing Provider:    KATHI WHITTEN  PHONE,       PREOPERATIVE EVALUATION:  Today's date: 5/8/2023    Chris Otero is a 73 year old male who presents for a preoperative evaluation.      5/8/2023     2:55 PM   Additional Questions   Roomed by      Surgical Information:  Surgery/Procedure: Bone Marrow Bx  Surgery Location: [facility]  Surgeon: Dr. Ontiveros  Surgery Date: 5/9/23  Time of Surgery:   Where patient plans to recover: At home with family  Fax number for surgical facility: Note does not need to be faxed, will be available electronically in Epic.    Assessment & Plan     The proposed surgical procedure is considered LOW risk.    Preop general physical exam  73-year-old male here for preop clearance prior to bone marrow biopsy under MAC sedation.  He has tolerated previous surgery and anesthesia without any complications.  Overall risk is low and he is cleared for surgery and anesthesia.  He is avoiding aspirin and NSAIDs and is appropriately holding his Eliquis, using Lovenox as outlined below.    Chronic lymphocytic leukemia (H)  Progression of CLL with plans for bone marrow biopsy    DVT, lower extremity, proximal, acute, right (H)  Recent diagnosis of acute right lower extremity DVT.  He has been on Eliquis for the past 6 weeks.  Holding Eliquis for the 2 days prior to his procedure and has been on Lovenox taking his last dose of it this morning.  We discussed that he should take no further Lovenox injections and should continue to hold his Eliquis until after his procedure.    HTN (hypertension), benign  Blood pressure is well controlled.  I am recommending taking his usual dose of Toprol-XL on the morning of surgery with a small sip of water.  Recommending that he hold lisinopril as he normally  takes this in the afternoon anyway.                 Additional Medication Instructions:  Continue to hold Eliquis and restart following your bone marrow biopsy tomorrow    Do not give yourself any further Lovenox injections    Avoid aspirin and over-the-counter NSAIDs including ibuprofen, Motrin, Advil and Aleve prior to your procedure    On the morning of your procedure, take your usual dose of metoprolol with a small sip of water.  Hold all of your other medications until later in the day    RECOMMENDATION:  APPROVAL GIVEN to proceed with proposed procedure, without further diagnostic evaluation.            Subjective     HPI related to upcoming procedure: 73-year-old male here for preop clearance prior to bone marrow biopsy under MAC sedation.  Recent right lower extremity DVT on Eliquis.  See assessment and plan and details below for further information        5/8/2023     2:48 PM   Preop Questions   1. Have you ever had a heart attack or stroke? No   2. Have you ever had surgery on your heart or blood vessels, such as a stent placement, a coronary artery bypass, or surgery on an artery in your head, neck, heart, or legs? No   3. Do you have chest pain with activity? No   4. Do you have a history of  heart failure? No   5. Do you currently have a cold, bronchitis or symptoms of other infection? No   6. Do you have a cough, shortness of breath, or wheezing? No   7. Do you or anyone in your family have previous history of blood clots? YES -acute right lower extremity DVT March 2023   8. Do you or does anyone in your family have a serious bleeding problem such as prolonged bleeding following surgeries or cuts? No   9. Have you ever had problems with anemia or been told to take iron pills? YES -chronic anemia secondary to CLL   10. Have you had any abnormal blood loss such as black, tarry or bloody stools? No   11. Have you ever had a blood transfusion? No   12. Are you willing to have a blood transfusion if it is  medically needed before, during, or after your surgery? Yes   13. Have you or any of your relatives ever had problems with anesthesia? No   14. Do you have sleep apnea, excessive snoring or daytime drowsiness? No   15. Do you have any artifical heart valves or other implanted medical devices like a pacemaker, defibrillator, or continuous glucose monitor? No   16. Do you have artificial joints? No   17. Are you allergic to latex? No       Health Care Directive:  Patient does not have a Health Care Directive or Living Will:     Preoperative Review of :   reviewed - no record of controlled substances prescribed.          Review of Systems  12 point review of systems is negative other than what is discussed in the assessment and plan.  Specifically no exertional chest pain or increasing shortness of breath.  No abdominal pain.    Patient Active Problem List    Diagnosis Date Noted     DVT, lower extremity, proximal, acute, right (H) 03/27/2023     Priority: Medium     Chronic lymphocytic leukemia (H) 10/03/2022     Priority: Medium     Formatting of this note might be different from the original.  Diagnosed 9/2022, being followed at outside oncology clinic       Combined forms of age-related cataract of both eyes 12/19/2019     Priority: Medium     Formatting of this note might be different from the original.  Added automatically from request for surgery [id number]       Precordial pain 06/14/2019     Priority: Medium     Migraine headache with aura      Priority: Medium     Chronic low back pain      Priority: Medium     Dyslipidemia      Priority: Medium     BPH (benign prostatic hyperplasia)      Priority: Medium     HTN (hypertension), benign      Priority: Medium     Glucose intolerance (impaired glucose tolerance)      Priority: Medium     GERD (gastroesophageal reflux disease)      Priority: Medium      Past Medical History:   Diagnosis Date     Abnormal EKG      BPH (benign prostatic hyperplasia)      Chest  discomfort      Chronic low back pain      Diabetes (H)      Dyslipidemia      GERD (gastroesophageal reflux disease)      Glucose intolerance (impaired glucose tolerance)      HTN (hypertension), benign      Migraine headache with aura      Stable angina (H)      Past Surgical History:   Procedure Laterality Date     XR ANKLE SURGERY LUIS RIGHT      S/P fx.     Current Outpatient Medications   Medication Sig Dispense Refill     acetaminophen (TYLENOL) 500 MG tablet Take 1,000 mg by mouth as needed       apixaban ANTICOAGULANT (ELIQUIS) 5 MG tablet Take 5 mg by mouth 2 times daily       ASPIRIN NOT PRESCRIBED (INTENTIONAL) Please choose reason not prescribed from choices below.       atorvastatin (LIPITOR) 80 MG tablet Take 1 tablet (80 mg) by mouth daily 90 tablet 3     cetirizine (ZYRTEC) 10 MG tablet Take 1 tablet by mouth daily at 2 pm       cholecalciferol 50 MCG (2000 UT) tablet Take 2,000 Units by mouth daily       enoxaparin ANTICOAGULANT (LOVENOX) 60 MG/0.6ML syringe PLEASE SEE ATTACHED FOR DETAILED DIRECTIONS       lisinopril (ZESTRIL) 40 MG tablet Take 1 tablet by mouth daily at 2 pm       metoprolol succinate ER (TOPROL-XL) 50 MG 24 hr tablet Take 1 tablet (50 mg) by mouth daily 90 tablet 3     Multiple Vitamin (QUINTABS) TABS Take 1 tablet by mouth daily       nitroGLYcerin (NITROSTAT) 0.4 MG sublingual tablet Place 1 tablet (0.4 mg) under the tongue every 5 minutes as needed for chest pain If symptoms persist 5 minutes after 1st dose call 911. 30 tablet 3     olopatadine (PATANOL) 0.1 % ophthalmic solution 1 DROP IN EACH AFFECTED EYE 2 TIMES A DAY 30 DAY(S)       omeprazole (PRILOSEC) 40 MG DR capsule TAKE 1 CAPSULE BY MOUTH EVERY DAY IN THE MORNING FOR 90 DAYS       tamsulosin (FLOMAX) 0.4 MG capsule Take 1 capsule (0.4 mg) by mouth At Bedtime 90 capsule 3     mometasone (NASONEX) 50 MCG/ACT nasal spray 2 SPRAYS IN EACH NOSTRIL INTRANASALLY ONCE A DAY 30 DAY(S)         No Known Allergies     Social  "History     Tobacco Use     Smoking status: Never     Smokeless tobacco: Never   Vaping Use     Vaping status: Not on file   Substance Use Topics     Alcohol use: No       History   Drug Use No         Objective     /68 (BP Location: Right arm, Patient Position: Sitting, Cuff Size: Adult Regular)   Pulse 62   Temp 97.5  F (36.4  C) (Oral)   Resp 16   Ht 1.638 m (5' 4.5\")   Wt 75.4 kg (166 lb 3.2 oz)   SpO2 95%   BMI 28.09 kg/m      Physical Exam  GENERAL APPEARANCE: healthy, alert and no distress  HENT: Normal  Neck with cervical lymphadenopathy  RESP: lungs clear to auscultation - no rales, rhonchi or wheezes  CV: regular rate and rhythm, normal S1 S2, no S3 or S4 and no murmur, click or rub.  No carotid bruits  ABDOMEN: soft, nontender, no HSM or masses and bowel sounds normal  NEURO: Normal strength and tone, sensory exam grossly normal, mentation intact and speech normal    Recent Labs   Lab Test 10/26/22  1248   CR 0.9        Diagnostics:  No labs were ordered during this visit.   No EKG required for low risk surgery (cataract, skin procedure, breast biopsy, etc).    Revised Cardiac Risk Index (RCRI):  The patient has the following serious cardiovascular risks for perioperative complications:   - No serious cardiac risks = 0 points     RCRI Interpretation: 0 points: Class I (very low risk - 0.4% complication rate)           Signed Electronically by: Seymour Arnold MD  Copy of this evaluation report is provided to requesting physician.      "

## 2023-05-09 ENCOUNTER — HOSPITAL ENCOUNTER (OUTPATIENT)
Facility: CLINIC | Age: 73
Discharge: HOME | End: 2023-05-09
Attending: PATHOLOGY | Admitting: PATHOLOGY
Payer: COMMERCIAL

## 2023-05-09 ENCOUNTER — ANESTHESIA (OUTPATIENT)
Dept: GASTROENTEROLOGY | Facility: CLINIC | Age: 73
End: 2023-05-09
Payer: COMMERCIAL

## 2023-05-09 VITALS
OXYGEN SATURATION: 100 % | SYSTOLIC BLOOD PRESSURE: 127 MMHG | RESPIRATION RATE: 16 BRPM | WEIGHT: 166 LBS | DIASTOLIC BLOOD PRESSURE: 90 MMHG | BODY MASS INDEX: 28.05 KG/M2

## 2023-05-09 DIAGNOSIS — C91.10 CHRONIC LYMPHOCYTIC LEUKEMIA (H): Primary | ICD-10-CM

## 2023-05-09 LAB
BASOPHILS # BLD MANUAL: 0 10E3/UL (ref 0–0.2)
BASOPHILS NFR BLD MANUAL: 0 %
EOSINOPHIL # BLD MANUAL: 1.1 10E3/UL (ref 0–0.7)
EOSINOPHIL NFR BLD MANUAL: 1 %
ERYTHROCYTE [DISTWIDTH] IN BLOOD BY AUTOMATED COUNT: 13.5 % (ref 10–15)
HCT VFR BLD AUTO: 43.2 % (ref 40–53)
HGB BLD-MCNC: 13.5 G/DL (ref 13.3–17.7)
LYMPHOCYTES # BLD MANUAL: 105.5 10E3/UL (ref 0.8–5.3)
LYMPHOCYTES NFR BLD MANUAL: 96 %
MCH RBC QN AUTO: 30 PG (ref 26.5–33)
MCHC RBC AUTO-ENTMCNC: 31.3 G/DL (ref 31.5–36.5)
MCV RBC AUTO: 96 FL (ref 78–100)
MONOCYTES # BLD MANUAL: 0 10E3/UL (ref 0–1.3)
MONOCYTES NFR BLD MANUAL: 0 %
NEUTROPHILS # BLD MANUAL: 3.3 10E3/UL (ref 1.6–8.3)
NEUTROPHILS NFR BLD MANUAL: 3 %
PLAT MORPH BLD: ABNORMAL
PLATELET # BLD AUTO: 168 10E3/UL (ref 150–450)
RBC # BLD AUTO: 4.5 10E6/UL (ref 4.4–5.9)
RBC MORPH BLD: ABNORMAL
RETICS # AUTO: 0.06 10E6/UL (ref 0.03–0.1)
RETICS/RBC NFR AUTO: 1.4 % (ref 0.5–2)
SMUDGE CELLS BLD QL SMEAR: PRESENT
WBC # BLD AUTO: 109.9 10E3/UL (ref 4–11)

## 2023-05-09 PROCEDURE — 88341 IMHCHEM/IMCYTCHM EA ADD ANTB: CPT | Mod: 26 | Performed by: PATHOLOGY

## 2023-05-09 PROCEDURE — 38221 DX BONE MARROW BIOPSIES: CPT | Performed by: PATHOLOGY

## 2023-05-09 PROCEDURE — 370N000017 HC ANESTHESIA TECHNICAL FEE, PER MIN: Performed by: PATHOLOGY

## 2023-05-09 PROCEDURE — 88305 TISSUE EXAM BY PATHOLOGIST: CPT | Mod: TC | Performed by: PATHOLOGY

## 2023-05-09 PROCEDURE — 38222 DX BONE MARROW BX & ASPIR: CPT | Performed by: PATHOLOGY

## 2023-05-09 PROCEDURE — 88368 INSITU HYBRIDIZATION MANUAL: CPT | Mod: 26 | Performed by: MEDICAL GENETICS

## 2023-05-09 PROCEDURE — 88342 IMHCHEM/IMCYTCHM 1ST ANTB: CPT | Mod: 26 | Performed by: PATHOLOGY

## 2023-05-09 PROCEDURE — 88161 CYTOPATH SMEAR OTHER SOURCE: CPT | Mod: 26 | Performed by: PATHOLOGY

## 2023-05-09 PROCEDURE — 88313 SPECIAL STAINS GROUP 2: CPT | Mod: 26 | Performed by: PATHOLOGY

## 2023-05-09 PROCEDURE — 88311 DECALCIFY TISSUE: CPT | Mod: 26 | Performed by: PATHOLOGY

## 2023-05-09 PROCEDURE — 85045 AUTOMATED RETICULOCYTE COUNT: CPT | Performed by: PATHOLOGY

## 2023-05-09 PROCEDURE — 88184 FLOWCYTOMETRY/ TC 1 MARKER: CPT | Performed by: PATHOLOGY

## 2023-05-09 PROCEDURE — 88369 M/PHMTRC ALYSISHQUANT/SEMIQ: CPT | Mod: 26 | Performed by: MEDICAL GENETICS

## 2023-05-09 PROCEDURE — 88237 TISSUE CULTURE BONE MARROW: CPT | Performed by: PATHOLOGY

## 2023-05-09 PROCEDURE — 36415 COLL VENOUS BLD VENIPUNCTURE: CPT | Performed by: PATHOLOGY

## 2023-05-09 PROCEDURE — 85007 BL SMEAR W/DIFF WBC COUNT: CPT | Performed by: PATHOLOGY

## 2023-05-09 PROCEDURE — 85027 COMPLETE CBC AUTOMATED: CPT | Performed by: PATHOLOGY

## 2023-05-09 PROCEDURE — 250N000009 HC RX 250: Performed by: NURSE ANESTHETIST, CERTIFIED REGISTERED

## 2023-05-09 PROCEDURE — 88271 CYTOGENETICS DNA PROBE: CPT | Performed by: PATHOLOGY

## 2023-05-09 PROCEDURE — 999N000010 HC STATISTIC ANES STAT CODE-CRNA PER MINUTE: Performed by: PATHOLOGY

## 2023-05-09 PROCEDURE — 250N000011 HC RX IP 250 OP 636: Performed by: NURSE ANESTHETIST, CERTIFIED REGISTERED

## 2023-05-09 PROCEDURE — 88305 TISSUE EXAM BY PATHOLOGIST: CPT | Mod: 26 | Performed by: PATHOLOGY

## 2023-05-09 PROCEDURE — 250N000009 HC RX 250: Performed by: PATHOLOGY

## 2023-05-09 PROCEDURE — 85060 BLOOD SMEAR INTERPRETATION: CPT | Performed by: PATHOLOGY

## 2023-05-09 PROCEDURE — 88189 FLOWCYTOMETRY/READ 16 & >: CPT | Performed by: PATHOLOGY

## 2023-05-09 PROCEDURE — 85097 BONE MARROW INTERPRETATION: CPT | Performed by: PATHOLOGY

## 2023-05-09 PROCEDURE — 88264 CHROMOSOME ANALYSIS 20-25: CPT | Performed by: PATHOLOGY

## 2023-05-09 PROCEDURE — 88291 CYTO/MOLECULAR REPORT: CPT | Performed by: MEDICAL GENETICS

## 2023-05-09 PROCEDURE — 258N000003 HC RX IP 258 OP 636: Performed by: NURSE ANESTHETIST, CERTIFIED REGISTERED

## 2023-05-09 RX ORDER — ONDANSETRON 2 MG/ML
INJECTION INTRAMUSCULAR; INTRAVENOUS PRN
Status: DISCONTINUED | OUTPATIENT
Start: 2023-05-09 | End: 2023-05-09

## 2023-05-09 RX ORDER — SODIUM CHLORIDE, SODIUM LACTATE, POTASSIUM CHLORIDE, CALCIUM CHLORIDE 600; 310; 30; 20 MG/100ML; MG/100ML; MG/100ML; MG/100ML
INJECTION, SOLUTION INTRAVENOUS CONTINUOUS PRN
Status: DISCONTINUED | OUTPATIENT
Start: 2023-05-09 | End: 2023-05-09

## 2023-05-09 RX ORDER — PROPOFOL 10 MG/ML
INJECTION, EMULSION INTRAVENOUS CONTINUOUS PRN
Status: DISCONTINUED | OUTPATIENT
Start: 2023-05-09 | End: 2023-05-09

## 2023-05-09 RX ORDER — LIDOCAINE HYDROCHLORIDE 20 MG/ML
INJECTION, SOLUTION INFILTRATION; PERINEURAL PRN
Status: DISCONTINUED | OUTPATIENT
Start: 2023-05-09 | End: 2023-05-09

## 2023-05-09 RX ORDER — LIDOCAINE HYDROCHLORIDE 10 MG/ML
8-10 INJECTION, SOLUTION EPIDURAL; INFILTRATION; INTRACAUDAL; PERINEURAL
Status: DISCONTINUED | OUTPATIENT
Start: 2023-05-09 | End: 2023-05-09 | Stop reason: HOSPADM

## 2023-05-09 RX ORDER — LIDOCAINE 40 MG/G
CREAM TOPICAL
Status: DISCONTINUED | OUTPATIENT
Start: 2023-05-09 | End: 2023-05-09 | Stop reason: HOSPADM

## 2023-05-09 RX ADMIN — PROPOFOL 175 MCG/KG/MIN: 10 INJECTION, EMULSION INTRAVENOUS at 11:55

## 2023-05-09 RX ADMIN — ONDANSETRON 4 MG: 2 INJECTION INTRAMUSCULAR; INTRAVENOUS at 11:55

## 2023-05-09 RX ADMIN — SODIUM CHLORIDE, POTASSIUM CHLORIDE, SODIUM LACTATE AND CALCIUM CHLORIDE: 600; 310; 30; 20 INJECTION, SOLUTION INTRAVENOUS at 11:55

## 2023-05-09 RX ADMIN — LIDOCAINE HYDROCHLORIDE 50 MG: 20 INJECTION, SOLUTION INFILTRATION; PERINEURAL at 11:55

## 2023-05-09 ASSESSMENT — LIFESTYLE VARIABLES: TOBACCO_USE: 0

## 2023-05-09 ASSESSMENT — ENCOUNTER SYMPTOMS: SEIZURES: 0

## 2023-05-09 ASSESSMENT — ACTIVITIES OF DAILY LIVING (ADL)
ADLS_ACUITY_SCORE: 35
ADLS_ACUITY_SCORE: 35

## 2023-05-09 NOTE — ANESTHESIA CARE TRANSFER NOTE
Patient: Chris Otero    Procedure: Procedure(s):  Bone marrow biopsy, bone specimen, needle/trocar       Diagnosis: Lymphocytosis [D72.820]  Diagnosis Additional Information: No value filed.    Anesthesia Type:   MAC     Note:    Oropharynx: oropharynx clear of all foreign objects and spontaneously breathing  Level of Consciousness: awake  Oxygen Supplementation: room air    Independent Airway: airway patency satisfactory and stable  Dentition: dentition unchanged  Vital Signs Stable: post-procedure vital signs reviewed and stable  Report to RN Given: handoff report given  Patient transferred to: PACU    Handoff Report: Identifed the Patient, Identified the Reponsible Provider, Reviewed the pertinent medical history, Discussed the surgical course, Reviewed Intra-OP anesthesia mangement and issues during anesthesia, Set expectations for post-procedure period and Allowed opportunity for questions and acknowledgement of understanding      Vitals:  Vitals Value Taken Time   BP     Temp     Pulse     Resp     SpO2         Electronically Signed By: GEOFFREY Dubois CRNA  May 9, 2023  12:33 PM

## 2023-05-09 NOTE — ANESTHESIA POSTPROCEDURE EVALUATION
Patient: Chris Otero    Procedure: Procedure(s):  Bone marrow biopsy, bone specimen, needle/trocar       Anesthesia Type:  MAC    Note:     Postop Pain Control: Uneventful            Sign Out: Well controlled pain   PONV: No   Neuro/Psych: Uneventful            Sign Out: Acceptable/Baseline neuro status   Airway/Respiratory: Uneventful            Sign Out: Acceptable/Baseline resp. status   CV/Hemodynamics: Uneventful            Sign Out: Acceptable CV status; No obvious hypovolemia; No obvious fluid overload   Other NRE: NONE   DID A NON-ROUTINE EVENT OCCUR? No           Last vitals:  Vitals Value Taken Time   /90 05/09/23 1250   Temp     Pulse     Resp 16 05/09/23 1250   SpO2 100 % 05/09/23 1250       Electronically Signed By: Ever Blackman MD  May 9, 2023  1:43 PM

## 2023-05-09 NOTE — INTERVAL H&P NOTE
"I have reviewed the surgical (or preoperative) H&P that is linked to this encounter, and examined the patient. There are no significant changes    Clinical Conditions Present on Arrival:  Clinically Significant Risk Factors Present on Admission                # Drug Induced Coagulation Defect: home medication list includes an anticoagulant medication   # Overweight: Estimated body mass index is 28.05 kg/m  as calculated from the following:    Height as of 5/8/23: 1.638 m (5' 4.5\").    Weight as of this encounter: 75.3 kg (166 lb).       "

## 2023-05-09 NOTE — ANESTHESIA PREPROCEDURE EVALUATION
Anesthesia Pre-Procedure Evaluation    Patient: Chris Otero   MRN: [id number] : 1950        Procedure : Procedure(s):  Bone marrow biopsy, bone specimen, needle/trocar          Past Medical History:   Diagnosis Date     Abnormal EKG      BPH (benign prostatic hyperplasia)      Chest discomfort      Chronic low back pain      Diabetes (H)      Dyslipidemia      GERD (gastroesophageal reflux disease)      Glucose intolerance (impaired glucose tolerance)      HTN (hypertension), benign      Migraine headache with aura      Stable angina (H)       Past Surgical History:   Procedure Laterality Date     XR ANKLE SURGERY LUIS RIGHT      S/P fx.      No Known Allergies   Social History     Tobacco Use     Smoking status: Never     Smokeless tobacco: Never   Vaping Use     Vaping status: Not on file   Substance Use Topics     Alcohol use: No      Wt Readings from Last 1 Encounters:   23 75.3 kg (166 lb)        Anesthesia Evaluation   Pt has had prior anesthetic.     No history of anesthetic complications       ROS/MED HX  ENT/Pulmonary:    (-) tobacco use and sleep apnea   Neurologic:    (-) no seizures and no CVA   Cardiovascular:     (+) Dyslipidemia hypertension-----    METS/Exercise Tolerance:     Hematologic:       Musculoskeletal:       GI/Hepatic:     (+) GERD,  (-) liver disease   Renal/Genitourinary:     (+) BPH,  (-) renal disease   Endo:     (+) type II DM,  (-) thyroid disease   Psychiatric/Substance Use:       Infectious Disease:       Malignancy:   (+) Malignancy, History of Lymphoma/Leukemia.    Other:      (+) , H/O Chronic Pain,        Physical Exam    Airway        Mallampati: II   TM distance: > 3 FB   Neck ROM: full   Mouth opening: > 3 cm    Respiratory Devices and Support         Dental       (+) Removable bridges or other hardware      Cardiovascular   cardiovascular exam normal          Pulmonary   pulmonary exam normal                OUTSIDE LABS:  CBC: No results found for: WBC, HGB,  HCT, PLT  BMP:   Lab Results   Component Value Date     05/16/2019    POTASSIUM 4.0 05/16/2019    CHLORIDE 107 05/16/2019    CO2 27 05/16/2019    BUN 11 05/16/2019    CR 0.9 10/26/2022    CR 0.92 05/16/2019    GLC 95 05/16/2019     COAGS: No results found for: PTT, INR, FIBR  POC: No results found for: BGM, HCG, HCGS  HEPATIC:   Lab Results   Component Value Date    ALBUMIN 4.0 05/16/2019    PROTTOTAL 7.2 05/16/2019    ALT 23 05/16/2019    AST 11 05/16/2019    ALKPHOS 97 05/16/2019    BILITOTAL 0.5 05/16/2019     OTHER:   Lab Results   Component Value Date    A1C 5.9 (H) 05/16/2019    MORGAN 9.0 05/16/2019       Anesthesia Plan    ASA Status:  3   NPO Status:  NPO Appropriate    Anesthesia Type: MAC.     - Reason for MAC: straight local not clinically adequate              Consents    Anesthesia Plan(s) and associated risks, benefits, and realistic alternatives discussed. Questions answered and patient/representative(s) expressed understanding.    - Discussed:     - Discussed with:  Patient, , Spouse         Postoperative Care    Pain management: Multi-modal analgesia.   PONV prophylaxis: Ondansetron (or other 5HT-3)     Comments:                Ever Blackman MD

## 2023-05-09 NOTE — PROCEDURES
This procedure was performed for the indication of: lymphocytosis.  Please refer to the H&P from the primary provider (Dr. Feng).    The patient was positively identified and informed consent was obtained (see the completed Affirmation of Consent for Bone Marrow Aspiration and/or Biopsy Procedure(s) form in the patient's chart). The patient was placed in the prone position and the bony landmarks of the pelvis were identified. Medical staff reconfirmed the patient's name, date of birth and procedure. The skin over the posterior iliac crest was scrubbed and draped in a sterile fashion. The local area of the procedure was anesthetized with a total of 5 mL of 1% Lidocaine and a small incision was made.  The patient did receive MAC sedation.    Trephine bone marrow core(s) was/were obtained from the left posterior iliac crest. Bone marrow aspirate was obtained from the left posterior iliac crest for morphology with immunophenotyping and cytogenetics (including CLL FISH panel) and molecular diagnostics (process and hold).    Direct pressure was applied to the biopsy site with sterile gauze. The biopsy site was cleaned with alcohol and a sterile dressing was placed over the biopsy incision using a pressure bandage. The patient was then placed in the supine position to maintain pressure on the biopsy site. Post-procedure wound care instructions, including routine dressing instructions and analgesia, were given to the patient. The procedure was completed without complication.

## 2023-05-10 LAB
PATH REPORT.COMMENTS IMP SPEC: ABNORMAL
PATH REPORT.COMMENTS IMP SPEC: YES
PATH REPORT.COMMENTS IMP SPEC: YES
PATH REPORT.FINAL DX SPEC: ABNORMAL
PATH REPORT.FINAL DX SPEC: ABNORMAL
PATH REPORT.MICROSCOPIC SPEC OTHER STN: ABNORMAL
PATH REPORT.RELEVANT HX SPEC: ABNORMAL
PATH REPORT.RELEVANT HX SPEC: ABNORMAL
PATH REPORT.SUPPLEMENTAL REPORTS SPEC: ABNORMAL

## 2023-05-15 LAB — INTERPRETATION: NORMAL

## 2023-05-17 LAB
CULTURE HARVEST COMPLETE DATE: NORMAL
INTERPRETATION: NORMAL

## 2023-06-14 LAB
CULTURE HARVEST COMPLETE DATE: NORMAL

## 2023-07-04 LAB
INTERPRETATION: NORMAL
ISCN: NORMAL
METHODS: NORMAL

## 2023-08-04 ENCOUNTER — ANCILLARY PROCEDURE (OUTPATIENT)
Dept: ULTRASOUND IMAGING | Facility: CLINIC | Age: 73
End: 2023-08-04
Attending: INTERNAL MEDICINE
Payer: COMMERCIAL

## 2023-08-04 DIAGNOSIS — D69.6 THROMBOPENIA (H): ICD-10-CM

## 2023-08-04 DIAGNOSIS — I82.411 THROMBOSIS OF RIGHT FEMORAL VEIN (H): ICD-10-CM

## 2023-08-04 DIAGNOSIS — Z79.01 LONG TERM (CURRENT) USE OF ANTICOAGULANTS: ICD-10-CM

## 2023-08-04 PROCEDURE — 93971 EXTREMITY STUDY: CPT | Mod: RT

## 2023-12-07 ENCOUNTER — ANCILLARY PROCEDURE (OUTPATIENT)
Dept: ULTRASOUND IMAGING | Facility: CLINIC | Age: 73
End: 2023-12-07
Attending: INTERNAL MEDICINE
Payer: COMMERCIAL

## 2023-12-07 DIAGNOSIS — Z79.01 LONG TERM (CURRENT) USE OF ANTICOAGULANTS: ICD-10-CM

## 2023-12-07 DIAGNOSIS — D69.6 THROMBOCYTOPENIA, UNSPECIFIED (H): ICD-10-CM

## 2023-12-07 DIAGNOSIS — I82.411: ICD-10-CM

## 2023-12-07 PROCEDURE — 93971 EXTREMITY STUDY: CPT | Mod: RT

## 2024-06-20 ENCOUNTER — PATIENT OUTREACH (OUTPATIENT)
Dept: INTERNAL MEDICINE | Facility: CLINIC | Age: 74
End: 2024-06-20
Payer: COMMERCIAL

## 2024-06-20 NOTE — TELEPHONE ENCOUNTER
Patient Quality Outreach    Patient is due for the following:   Physical Annual Wellness Visit    Next Steps:   Schedule a Annual Wellness Visit    Type of outreach:    Sent letter.      Questions for provider review:    None           Lorene Nicholson MA

## 2024-06-20 NOTE — LETTER
June 20, 2024      Chris Otero  [address]   [facility] [address]      Your healthcare team cares about your health. To provide you with the best care, we have reviewed your chart and based on our findings, we see that you are due to:     PREVENTATIVE VISIT: Annual Medicare Wellness:Schedule an Annual Medicare Wellness Exam. Please call your [facility] clinic to set up your appointment.    If you have already completed these items, please contact the clinic via phone or Mychart so your care team can review and update your records.  Thank you for choosing [facility] for your healthcare needs. For any questions, concerns, or to schedule an appointment please contact the clinic.     Healthy Regards,    Your [facility] Care Team

## 2024-06-20 NOTE — LETTER
June 20, 2024      Chris Otero  [address]   [facility] [address]      Your healthcare team cares about your health. To provide you with the best care, we have reviewed your chart and based on our findings, we see that you are due to:     PREVENTATIVE VISIT: Annual Medicare Wellness:Schedule an Annual Medicare Wellness Exam. Please call your [facility] clinic to set up your appointment.    If you have already completed these items, please contact the clinic via phone or Mychart so your care team can review and update your records.  Thank you for choosing [facility] for your healthcare needs. For any questions, concerns, or to schedule an appointment please contact the clinic.     Healthy Regards,    Your [facility] Care Team

## (undated) DEVICE — SPECIMEN CONTAINER 60MLW/10% FORMALIN 59601

## (undated) DEVICE — PEN MARKING SKIN